# Patient Record
Sex: MALE | Race: BLACK OR AFRICAN AMERICAN | NOT HISPANIC OR LATINO | URBAN - METROPOLITAN AREA
[De-identification: names, ages, dates, MRNs, and addresses within clinical notes are randomized per-mention and may not be internally consistent; named-entity substitution may affect disease eponyms.]

---

## 2023-09-17 ENCOUNTER — INPATIENT (INPATIENT)
Facility: HOSPITAL | Age: 38
LOS: 3 days | Discharge: HOME | DRG: 603 | End: 2023-09-21
Attending: STUDENT IN AN ORGANIZED HEALTH CARE EDUCATION/TRAINING PROGRAM | Admitting: INTERNAL MEDICINE
Payer: COMMERCIAL

## 2023-09-17 VITALS
SYSTOLIC BLOOD PRESSURE: 125 MMHG | RESPIRATION RATE: 18 BRPM | DIASTOLIC BLOOD PRESSURE: 75 MMHG | HEART RATE: 85 BPM | TEMPERATURE: 99 F | OXYGEN SATURATION: 95 % | WEIGHT: 179.9 LBS

## 2023-09-17 DIAGNOSIS — Z29.9 ENCOUNTER FOR PROPHYLACTIC MEASURES, UNSPECIFIED: ICD-10-CM

## 2023-09-17 DIAGNOSIS — L03.114 CELLULITIS OF LEFT UPPER LIMB: ICD-10-CM

## 2023-09-17 LAB
ALBUMIN SERPL ELPH-MCNC: 4.1 G/DL — SIGNIFICANT CHANGE UP (ref 3.3–5)
ALP SERPL-CCNC: 90 U/L — SIGNIFICANT CHANGE UP (ref 40–120)
ALT FLD-CCNC: 9 U/L — LOW (ref 10–45)
ANION GAP SERPL CALC-SCNC: 11 MMOL/L — SIGNIFICANT CHANGE UP (ref 5–17)
AST SERPL-CCNC: 12 U/L — SIGNIFICANT CHANGE UP (ref 10–40)
BASOPHILS # BLD AUTO: 0.03 K/UL — SIGNIFICANT CHANGE UP (ref 0–0.2)
BASOPHILS NFR BLD AUTO: 0.4 % — SIGNIFICANT CHANGE UP (ref 0–2)
BILIRUB SERPL-MCNC: 0.3 MG/DL — SIGNIFICANT CHANGE UP (ref 0.2–1.2)
BUN SERPL-MCNC: 11 MG/DL — SIGNIFICANT CHANGE UP (ref 7–23)
CALCIUM SERPL-MCNC: 9.1 MG/DL — SIGNIFICANT CHANGE UP (ref 8.4–10.5)
CHLORIDE SERPL-SCNC: 100 MMOL/L — SIGNIFICANT CHANGE UP (ref 96–108)
CO2 SERPL-SCNC: 26 MMOL/L — SIGNIFICANT CHANGE UP (ref 22–31)
CREAT SERPL-MCNC: 0.72 MG/DL — SIGNIFICANT CHANGE UP (ref 0.5–1.3)
EGFR: 119 ML/MIN/1.73M2 — SIGNIFICANT CHANGE UP
EOSINOPHIL # BLD AUTO: 0.17 K/UL — SIGNIFICANT CHANGE UP (ref 0–0.5)
EOSINOPHIL NFR BLD AUTO: 2.5 % — SIGNIFICANT CHANGE UP (ref 0–6)
GLUCOSE SERPL-MCNC: 86 MG/DL — SIGNIFICANT CHANGE UP (ref 70–99)
HCT VFR BLD CALC: 41.7 % — SIGNIFICANT CHANGE UP (ref 39–50)
HGB BLD-MCNC: 13.5 G/DL — SIGNIFICANT CHANGE UP (ref 13–17)
HIV 1+2 AB+HIV1 P24 AG SERPL QL IA: SIGNIFICANT CHANGE UP
IMM GRANULOCYTES NFR BLD AUTO: 0.1 % — SIGNIFICANT CHANGE UP (ref 0–0.9)
LYMPHOCYTES # BLD AUTO: 1.77 K/UL — SIGNIFICANT CHANGE UP (ref 1–3.3)
LYMPHOCYTES # BLD AUTO: 25.6 % — SIGNIFICANT CHANGE UP (ref 13–44)
MCHC RBC-ENTMCNC: 30.6 PG — SIGNIFICANT CHANGE UP (ref 27–34)
MCHC RBC-ENTMCNC: 32.4 GM/DL — SIGNIFICANT CHANGE UP (ref 32–36)
MCV RBC AUTO: 94.6 FL — SIGNIFICANT CHANGE UP (ref 80–100)
MONOCYTES # BLD AUTO: 0.58 K/UL — SIGNIFICANT CHANGE UP (ref 0–0.9)
MONOCYTES NFR BLD AUTO: 8.4 % — SIGNIFICANT CHANGE UP (ref 2–14)
MRSA PCR RESULT.: POSITIVE
NEUTROPHILS # BLD AUTO: 4.35 K/UL — SIGNIFICANT CHANGE UP (ref 1.8–7.4)
NEUTROPHILS NFR BLD AUTO: 63 % — SIGNIFICANT CHANGE UP (ref 43–77)
NRBC # BLD: 0 /100 WBCS — SIGNIFICANT CHANGE UP (ref 0–0)
PLATELET # BLD AUTO: 184 K/UL — SIGNIFICANT CHANGE UP (ref 150–400)
POTASSIUM SERPL-MCNC: 3.8 MMOL/L — SIGNIFICANT CHANGE UP (ref 3.5–5.3)
POTASSIUM SERPL-SCNC: 3.8 MMOL/L — SIGNIFICANT CHANGE UP (ref 3.5–5.3)
PROT SERPL-MCNC: 7 G/DL — SIGNIFICANT CHANGE UP (ref 6–8.3)
RBC # BLD: 4.41 M/UL — SIGNIFICANT CHANGE UP (ref 4.2–5.8)
RBC # FLD: 11.9 % — SIGNIFICANT CHANGE UP (ref 10.3–14.5)
S AUREUS DNA NOSE QL NAA+PROBE: POSITIVE
SODIUM SERPL-SCNC: 137 MMOL/L — SIGNIFICANT CHANGE UP (ref 135–145)
WBC # BLD: 6.91 K/UL — SIGNIFICANT CHANGE UP (ref 3.8–10.5)
WBC # FLD AUTO: 6.91 K/UL — SIGNIFICANT CHANGE UP (ref 3.8–10.5)

## 2023-09-17 PROCEDURE — 99285 EMERGENCY DEPT VISIT HI MDM: CPT

## 2023-09-17 PROCEDURE — 73130 X-RAY EXAM OF HAND: CPT | Mod: 26,LT

## 2023-09-17 PROCEDURE — 99223 1ST HOSP IP/OBS HIGH 75: CPT

## 2023-09-17 RX ORDER — INFLUENZA VIRUS VACCINE 15; 15; 15; 15 UG/.5ML; UG/.5ML; UG/.5ML; UG/.5ML
0.5 SUSPENSION INTRAMUSCULAR ONCE
Refills: 0 | Status: DISCONTINUED | OUTPATIENT
Start: 2023-09-17 | End: 2023-09-21

## 2023-09-17 RX ORDER — VANCOMYCIN HCL 1 G
1000 VIAL (EA) INTRAVENOUS ONCE
Refills: 0 | Status: COMPLETED | OUTPATIENT
Start: 2023-09-17 | End: 2023-09-17

## 2023-09-17 RX ORDER — KETOROLAC TROMETHAMINE 30 MG/ML
15 SYRINGE (ML) INJECTION ONCE
Refills: 0 | Status: DISCONTINUED | OUTPATIENT
Start: 2023-09-17 | End: 2023-09-17

## 2023-09-17 RX ORDER — ACETAMINOPHEN 500 MG
650 TABLET ORAL EVERY 6 HOURS
Refills: 0 | Status: DISCONTINUED | OUTPATIENT
Start: 2023-09-17 | End: 2023-09-18

## 2023-09-17 RX ORDER — KETOROLAC TROMETHAMINE 30 MG/ML
15 SYRINGE (ML) INJECTION EVERY 6 HOURS
Refills: 0 | Status: DISCONTINUED | OUTPATIENT
Start: 2023-09-17 | End: 2023-09-18

## 2023-09-17 RX ORDER — VANCOMYCIN HCL 1 G
1250 VIAL (EA) INTRAVENOUS EVERY 12 HOURS
Refills: 0 | Status: DISCONTINUED | OUTPATIENT
Start: 2023-09-17 | End: 2023-09-18

## 2023-09-17 RX ORDER — PIPERACILLIN AND TAZOBACTAM 4; .5 G/20ML; G/20ML
3.38 INJECTION, POWDER, LYOPHILIZED, FOR SOLUTION INTRAVENOUS ONCE
Refills: 0 | Status: COMPLETED | OUTPATIENT
Start: 2023-09-17 | End: 2023-09-17

## 2023-09-17 RX ADMIN — Medication 15 MILLIGRAM(S): at 23:16

## 2023-09-17 RX ADMIN — PIPERACILLIN AND TAZOBACTAM 200 GRAM(S): 4; .5 INJECTION, POWDER, LYOPHILIZED, FOR SOLUTION INTRAVENOUS at 13:47

## 2023-09-17 RX ADMIN — Medication 166.67 MILLIGRAM(S): at 22:36

## 2023-09-17 RX ADMIN — Medication 15 MILLIGRAM(S): at 17:47

## 2023-09-17 RX ADMIN — Medication 250 MILLIGRAM(S): at 14:15

## 2023-09-17 RX ADMIN — Medication 15 MILLIGRAM(S): at 15:00

## 2023-09-17 RX ADMIN — Medication 15 MILLIGRAM(S): at 18:40

## 2023-09-17 RX ADMIN — Medication 15 MILLIGRAM(S): at 13:41

## 2023-09-17 RX ADMIN — PIPERACILLIN AND TAZOBACTAM 3.38 GRAM(S): 4; .5 INJECTION, POWDER, LYOPHILIZED, FOR SOLUTION INTRAVENOUS at 15:01

## 2023-09-17 RX ADMIN — Medication 15 MILLIGRAM(S): at 23:46

## 2023-09-17 RX ADMIN — Medication 1000 MILLIGRAM(S): at 16:16

## 2023-09-17 NOTE — ED PROVIDER NOTE - OBJECTIVE STATEMENT
38 yo m presents c/o pain, swelling to left 4th finger.  Pt stating he woke up yesterday with the finger swollen and painful, went to [facility] and states he was admitted for the night with IV abx, seen by orthopedics who cut open his finger although no pus came out.  Pt stating the plan was for him to be admitted for continued IV abx but he had to leave due to a death in the family.  Pt stating after leaving his hand was hurting a lot and he decided against traveling to see his family and came "to the nearest hospital" which he states was [facility].  Pt denies fever, chills, all other ROS negative.

## 2023-09-17 NOTE — ED PROVIDER NOTE - CLINICAL SUMMARY MEDICAL DECISION MAKING FREE TEXT BOX
36 y/o m no pmh presents c/o pain, swelling to left 4th finger since yesterday, was admitted for cellulitis at [facility] and left AMA.  In ED at [facility], pt afebrile, finger appears cellulitic, no evidence of abscess 36 y/o m no pmh presents c/o pain, swelling to left 4th finger since yesterday, was admitted for cellulitis at [facility] and left AMA.  In ED at [facility], pt afebrile, finger appears cellulitic, no evidence of abscess.  Labs with no leukocytosis, xr unremarkable.  Discussed with hand surgery Dr. Stauffer, recommends admission to medicine for IV abx and he will eval in the hospital.

## 2023-09-17 NOTE — H&P ADULT - NSHPPHYSICALEXAM_GEN_ALL_CORE
VITAL SIGNS:  T(C): 37 (09-17-23 @ 15:30), Max: 37 (09-17-23 @ 12:28)  T(F): 98.6 (09-17-23 @ 15:30), Max: 98.6 (09-17-23 @ 12:28)  HR: 54 (09-17-23 @ 15:30) (54 - 85)  BP: 116/74 (09-17-23 @ 15:30) (116/74 - 125/75)  BP(mean): --  RR: 16 (09-17-23 @ 15:30) (16 - 18)  SpO2: 96% (09-17-23 @ 15:30) (95% - 96%)  Wt(kg): --    PHYSICAL EXAM:  Constitutional: WDWN resting comfortably in bed; NAD  Head: NC/AT  Eyes: PERRL, EOMI, anicteric sclera  ENT: no nasal discharge; uvula midline, no oropharyngeal erythema or exudates; MMM  Neck: supple; no JVD or thyromegaly  Respiratory: CTA B/L; no W/R/R, no retractions  Cardiac: +S1/S2; RRR; no M/R/G; PMI non-displaced  Gastrointestinal: abdomen soft, NT/ND; no rebound or guarding  Back: spine midline, no bony tenderness or step-offs; no CVAT B/L  Extremities: WWP, no clubbing or cyanosis; no peripheral edema  Musculoskeletal: NROM x4; no joint swelling, tenderness or erythema  Vascular: 2+ radial, DP pulses B/L  Dermatologic: skin warm, dry and intact; no rashes, wounds, or scars  Neurologic: AAOx3; CNII-XII grossly intact; no focal deficits  Psychiatric: affect and characteristics of appearance, verbalizations, behaviors are appropriate VITAL SIGNS:  T(C): 37 (09-17-23 @ 15:30), Max: 37 (09-17-23 @ 12:28)  T(F): 98.6 (09-17-23 @ 15:30), Max: 98.6 (09-17-23 @ 12:28)  HR: 54 (09-17-23 @ 15:30) (54 - 85)  BP: 116/74 (09-17-23 @ 15:30) (116/74 - 125/75)  BP(mean): --  RR: 16 (09-17-23 @ 15:30) (16 - 18)  SpO2: 96% (09-17-23 @ 15:30) (95% - 96%)  Wt(kg): --    PHYSICAL EXAM:  Constitutional: WDWN resting comfortably in bed; NAD  Head: NC/AT  Eyes: PERRL, EOMI, anicteric sclera  ENT: no nasal discharge; uvula midline, no oropharyngeal erythema or exudates; MMM  Neck: supple; no JVD or thyromegaly  Respiratory: CTA B/L; no W/R/R, no retractions  Cardiac: +S1/S2; RRR; no M/R/G; PMI non-displaced  Gastrointestinal: abdomen soft, NT/ND; no rebound or guarding  Back: spine midline, no bony tenderness or step-offs; no CVAT B/L  Extremities: WWP, no clubbing or cyanosis; no peripheral edema  Musculoskeletal: NROM x4; no joint swelling, tenderness or erythema  Vascular: 2+ radial, DP pulses B/L  Dermatologic: skin warm, = bandage wrapped over L 4th finger, edematous, erythematous through finger and dorsal aspect of hand  Neurologic: AAOx3; CNII-XII grossly intact; no focal deficits  Psychiatric: affect and characteristics of appearance, verbalizations, behaviors are appropriate

## 2023-09-17 NOTE — PATIENT PROFILE ADULT - STATED REASON FOR ADMISSION
woke up one morning with a blister on the left 4th finger and after 2 days it is swollen and painful.

## 2023-09-17 NOTE — H&P ADULT - PROBLEM SELECTOR PLAN 2
Electrolytes: replete as necessary  Nutrition: regular   DVT: none, IMPROVE score low  Code status: Full   Disposition: RMF > home

## 2023-09-17 NOTE — ED ADULT NURSE REASSESSMENT NOTE - NS ED NURSE REASSESS COMMENT FT1
VAncomycin IVPB completed, no adverse rxn.  Vital signs stable.  dressing in place to left hand 4th digit. for 4 uris admit.  Transfer pending.

## 2023-09-17 NOTE — ED PROVIDER NOTE - MUSCULOSKELETAL, MLM
left 4th finger +swelling, +erythema, +diffuse tenderness, s/p incision on dorsum of phalanx over DIPJ with no purulent discharge from wound, sensation intact distally, no proximal hand swelling or streaking

## 2023-09-17 NOTE — H&P ADULT - ATTENDING COMMENTS
Pt is a 38 yo M with PMH THC use p/w L ring finger pain and swelling x few days. First noticed a blister that he tried to drain on his own using a sewing needle (did not sanitize). Symptoms worsened but did not notice any drainage. Was evaluated at [facility] where ortho saw him and performed I&D and per pt without any drainage or symptom relief; XR reportedly showed soft tissue swelling, and pt was admitted for IV abx. He then left AMA d/t an unexpected family death but returned to the hospital d/t worsening symptoms. Of note, had similar episode to his face that he picked / self treated and his eye, similar story, both now resolved. Otherwise has been in his usual state of health without sick contacts. Denies IVDU; intermittently smokes THC. Hemodynamically stable on arrival with labs unremarkable. XR L hand neg acute process. Exam notable for L fourth digit dorsal aspect incision site (between DIP/PIP) c/d/i without visible purulence, some surrounding erythema and swelling with minimal overlying warmth. Sensation intact and equal throughout all 10 digits; full ROM at L 4th MCP and PIP; does have pain with testing but no limitation in movement. Pt is a 36 yo M with PMH THC use p/w L ring finger pain and swelling x few days. First noticed a blister that he tried to drain on his own using a sewing needle (did not sanitize). Symptoms worsened but did not notice any drainage. Was evaluated at [facility] where ortho saw him and performed I&D and per pt without any drainage or symptom relief; XR reportedly showed soft tissue swelling, and pt was admitted for IV abx. He then left AMA d/t an unexpected family death but returned to the hospital d/t worsening symptoms. Of note, had similar episode to his face that he picked / self treated and his eye, similar story, both now resolved. Otherwise has been in his usual state of health without sick contacts. Denies IVDU; intermittently smokes THC. Hemodynamically stable on arrival with labs unremarkable. XR L hand neg acute process. Hand surgery consulted, recommending IV abx and admission for monitoring. Received vanc/zosyn and toradol. Exam notable for L fourth digit dorsal aspect incision site (between DIP/PIP) c/d/i without visible purulence, some surrounding erythema and swelling with minimal overlying warmth. Sensation intact and equal throughout all 10 digits; full ROM at L 4th MCP and PIP; does have pain with testing but no limitation in range.     A/P:  #Cellulitis  - presentation as above; educated pt on avoiding picking at skin lesions especially with unsanitized sharp objects   - not meeting SIRS criteria  - labs unremarkable   - exam benign - ROM intact, sensation intact -- educated pt to notify team if this changes   - XR L hand neg acute finding  - will need collateral from [facility] -- ?culture data as pt s/p I&D  - hand sx consulted, f/u recs  - c/w vancomycin for now -- deescalate as able  - f/u BCx in lab  - send MRSA PCR  - low threshold for ID consult if not improved in 24h given digit involvement   - would also consider CT if not improving  - pain control: tylenol / toradol PRN     Dispo: pending medical optimization  Discussed with HS, rest as outlined above.

## 2023-09-17 NOTE — H&P ADULT - MLM HIDDEN
yes Pt with burning chest pain for 3 weeks, will check labs, EKG an reevaluate. Pain appears to be related to food, possible gastritis.

## 2023-09-17 NOTE — ED ADULT NURSE NOTE - OBJECTIVE STATEMENT
Patient arrives to ED w/ dressing and splint to left hand and 4th digit,   was seen at an ED in the [place] yesterday and I & D procedure done on abscess, had to leave the hospital due to family emergency but has increased pain so went back to another ED.  No fever/chills.  Did not take any pain meds PTA to ED,  was given Morphine yesterday.

## 2023-09-17 NOTE — ED ADULT NURSE NOTE - CHPI ED NUR QUALITY2
aching Ear Star Wedge Flap Text: The defect edges were debeveled with a #15 blade scalpel.  Given the location of the defect and the proximity to free margins (helical rim) an ear star wedge flap was deemed most appropriate.  Using a sterile surgical marker, the appropriate flap was drawn incorporating the defect and placing the expected incisions between the helical rim and antihelix where possible.  The area thus outlined was incised through and through with a #15 scalpel blade.

## 2023-09-17 NOTE — H&P ADULT - TIME BILLING
review of laboratory data, radiology results, consultants' recommendations, documentation in [place], discussion with patient/ACP and interdisciplinary staff (such as , social workers, etc). Interventions were performed as documented above.

## 2023-09-17 NOTE — ED ADULT NURSE NOTE - OTHER COMPLAINTS
Patient reports that he was in a different hospital and his finger was drained but he had to leave. Mid-Level Procedure Text (E): After obtaining clear surgical margins the patient was sent to a mid-level provider for surgical repair.  The patient understands they will receive post-surgical care and follow-up from the mid-level provider.

## 2023-09-17 NOTE — ED PROVIDER NOTE - NS ED ATTENDING STATEMENT MOD
This was a shared visit with the KORIN. I reviewed and verified the documentation and independently performed the documented:

## 2023-09-17 NOTE — H&P ADULT - NSHPLABSRESULTS_GEN_ALL_CORE
LABS:                         13.5   6.91  )-----------( 184      ( 17 Sep 2023 13:15 )             41.7     09-17    137  |  100  |  11  ----------------------------<  86  3.8   |  26  |  0.72    Ca    9.1      17 Sep 2023 13:15    TPro  7.0  /  Alb  4.1  /  TBili  0.3  /  DBili  x   /  AST  12  /  ALT  9<L>  /  AlkPhos  90  09-17      Urinalysis Basic - ( 17 Sep 2023 13:15 )    Color: x / Appearance: x / SG: x / pH: x  Gluc: 86 mg/dL / Ketone: x  / Bili: x / Urobili: x   Blood: x / Protein: x / Nitrite: x   Leuk Esterase: x / RBC: x / WBC x   Sq Epi: x / Non Sq Epi: x / Bacteria: x            RADIOLOGY, EKG & ADDITIONAL TESTS: Reviewed.

## 2023-09-17 NOTE — PATIENT PROFILE ADULT - FALL HARM RISK - UNIVERSAL INTERVENTIONS
Bed in lowest position, wheels locked, appropriate side rails in place/Call bell, personal items and telephone in reach/Instruct patient to call for assistance before getting out of bed or chair/Non-slip footwear when patient is out of bed/[place] to call system/Physically safe environment - no spills, clutter or unnecessary equipment/Purposeful Proactive Rounding/Room/bathroom lighting operational, light cord in reach

## 2023-09-17 NOTE — H&P ADULT - HISTORY OF PRESENT ILLNESS
In the ED:  Initial vital signs: T: 98.6 F, HR: 85, BP: 125/75, R: 18, SpO2: 95% on RA  Labs: unremarkable, HIV negative  Imaging: XR: Hand no acute fx, no OM  EKG: none   Medications: Toradol, Zosyn 3.375 g x1, vanc 1g x1  Consults: Hand Surgeon Dr. Stauffer  39 y/o M w/ no PMHx who presented to the ED with worsening finger pain and swelling. He was seen at [facility] after 2 day history of L 4th finger pain, erythema and edema. Patient states it started with a small blister which he tried to drain with a needle at home. He was seen by ortho, s/p I&D with no symptomatic relief. Started on IV vanc and zosyn and was planned for admission. However, pt had to AMA due to family emergency. He then decided to present today due to worsening pain.XR L hand showed mild tissue swelling, no fracture or OM. Patient denies any fevers, chills, blunt trauma, injury or URI symptoms. Denies any sick contacts but lives at home with baby niece. Patient noted to have folliculitis over L cheek which he popped 2 weeks ago. He then developed a stye over L eye with erythema and drainage several days ago which self resolved. And feels these lesions are connected.         In the ED:  Initial vital signs: T: 98.6 F, HR: 85, BP: 125/75, R: 18, SpO2: 95% on RA  Labs: unremarkable, HIV negative  Imaging: XR: Hand no acute fx, no OM  EKG: none   Medications: Toradol, Zosyn 3.375 g x1, vanc 1g x1  Consults: Hand Surgeon Dr. Stauffer  39 y/o M w/ no PMHx who presented to the ED with worsening finger pain and swelling. He was seen at [facility] after 2 day history of L 4th finger pain, erythema and edema. Patient states it started with a small blister which he tried to drain with a needle at home. He was seen by ortho, s/p I&D with no symptomatic relief. XR L hand showed mild tissue swelling, no fracture or OM. Started on IV vanc and zosyn and was planned for admission. However, pt had to AMA due to family emergency. He then decided to present today due to worsening pain. Patient denies any fevers, chills, blunt trauma, injury or URI symptoms. Denies any sick contacts but lives at home with baby niece. Patient noted to have folliculitis over L cheek which he popped 2 weeks ago. He then developed a stye over L eye with erythema and drainage several days ago which self resolved. And feels these lesions are connected.         In the ED:  Initial vital signs: T: 98.6 F, HR: 85, BP: 125/75, R: 18, SpO2: 95% on RA  Labs: unremarkable, HIV negative  Imaging: XR: Hand no acute fx, no OM  EKG: none   Medications: Toradol, Zosyn 3.375 g x1, vanc 1g x1  Consults: Hand Surgeon Dr. Stauffer

## 2023-09-17 NOTE — ED ADULT NURSE REASSESSMENT NOTE - NS ED NURSE REASSESS COMMENT FT1
Patient aoX 3, c/o of left hand 4th digit pain, swelling and discharge, no fever /chills.  Vital signs stable.  Left arm PIV #20 in place, all labs sent,no complications.  IV ABT ongoing, no adverse rxn.  For admit.

## 2023-09-17 NOTE — H&P ADULT - NSHPSOCIALHISTORY_GEN_ALL_CORE
Occasional EtOH use, smokes cannabis, no other drugs/IVDU  Lives in the [place] with family Occasional EtOH use, smokes cannabis, no other drugs/IVDU  Lives in the [place] with family  Independent

## 2023-09-17 NOTE — ED PROVIDER NOTE - ATTENDING APP SHARED VISIT CONTRIBUTION OF CARE
I have reviewed and agree with all pertinent clinical information, including history and physical exam and agree with treatment plan of the PA/NP.

## 2023-09-17 NOTE — ED ADULT NURSE NOTE - NSFALLUNIVINTERV_ED_ALL_ED
Bed/Stretcher in lowest position, wheels locked, appropriate side rails in place/Call bell, personal items and telephone in reach/Instruct patient to call for assistance before getting out of bed/chair/stretcher/Non-slip footwear applied when patient is off stretcher/[place] to call system/Physically safe environment - no spills, clutter or unnecessary equipment/Purposeful proactive rounding/Room/bathroom lighting operational, light cord in reach

## 2023-09-17 NOTE — H&P ADULT - NSCORESITESY/N_GEN_A_CORE_RD
Chief Complaint   Patient presents with     Follow Up     Ump Post-Op     Vitals were taken and medications reconciled.    Onur Melendez, EMT  1:45 PM  
No

## 2023-09-18 LAB
ALBUMIN SERPL ELPH-MCNC: 3.4 G/DL — SIGNIFICANT CHANGE UP (ref 3.3–5)
ALP SERPL-CCNC: 82 U/L — SIGNIFICANT CHANGE UP (ref 40–120)
ALT FLD-CCNC: 7 U/L — LOW (ref 10–45)
ANION GAP SERPL CALC-SCNC: 7 MMOL/L — SIGNIFICANT CHANGE UP (ref 5–17)
AST SERPL-CCNC: 12 U/L — SIGNIFICANT CHANGE UP (ref 10–40)
BASOPHILS # BLD AUTO: 0.02 K/UL — SIGNIFICANT CHANGE UP (ref 0–0.2)
BASOPHILS NFR BLD AUTO: 0.3 % — SIGNIFICANT CHANGE UP (ref 0–2)
BILIRUB SERPL-MCNC: 0.3 MG/DL — SIGNIFICANT CHANGE UP (ref 0.2–1.2)
BUN SERPL-MCNC: 12 MG/DL — SIGNIFICANT CHANGE UP (ref 7–23)
CALCIUM SERPL-MCNC: 8.6 MG/DL — SIGNIFICANT CHANGE UP (ref 8.4–10.5)
CHLORIDE SERPL-SCNC: 101 MMOL/L — SIGNIFICANT CHANGE UP (ref 96–108)
CO2 SERPL-SCNC: 26 MMOL/L — SIGNIFICANT CHANGE UP (ref 22–31)
CREAT SERPL-MCNC: 0.89 MG/DL — SIGNIFICANT CHANGE UP (ref 0.5–1.3)
EGFR: 112 ML/MIN/1.73M2 — SIGNIFICANT CHANGE UP
EOSINOPHIL # BLD AUTO: 0.19 K/UL — SIGNIFICANT CHANGE UP (ref 0–0.5)
EOSINOPHIL NFR BLD AUTO: 3 % — SIGNIFICANT CHANGE UP (ref 0–6)
GLUCOSE SERPL-MCNC: 115 MG/DL — HIGH (ref 70–99)
GRAM STN FLD: SIGNIFICANT CHANGE UP
HCT VFR BLD CALC: 37.9 % — LOW (ref 39–50)
HGB BLD-MCNC: 12.2 G/DL — LOW (ref 13–17)
IMM GRANULOCYTES NFR BLD AUTO: 0.2 % — SIGNIFICANT CHANGE UP (ref 0–0.9)
LYMPHOCYTES # BLD AUTO: 1.55 K/UL — SIGNIFICANT CHANGE UP (ref 1–3.3)
LYMPHOCYTES # BLD AUTO: 24.4 % — SIGNIFICANT CHANGE UP (ref 13–44)
MAGNESIUM SERPL-MCNC: 1.9 MG/DL — SIGNIFICANT CHANGE UP (ref 1.6–2.6)
MCHC RBC-ENTMCNC: 30.4 PG — SIGNIFICANT CHANGE UP (ref 27–34)
MCHC RBC-ENTMCNC: 32.2 GM/DL — SIGNIFICANT CHANGE UP (ref 32–36)
MCV RBC AUTO: 94.5 FL — SIGNIFICANT CHANGE UP (ref 80–100)
MONOCYTES # BLD AUTO: 0.52 K/UL — SIGNIFICANT CHANGE UP (ref 0–0.9)
MONOCYTES NFR BLD AUTO: 8.2 % — SIGNIFICANT CHANGE UP (ref 2–14)
NEUTROPHILS # BLD AUTO: 4.07 K/UL — SIGNIFICANT CHANGE UP (ref 1.8–7.4)
NEUTROPHILS NFR BLD AUTO: 63.9 % — SIGNIFICANT CHANGE UP (ref 43–77)
NRBC # BLD: 0 /100 WBCS — SIGNIFICANT CHANGE UP (ref 0–0)
PHOSPHATE SERPL-MCNC: 2.2 MG/DL — LOW (ref 2.5–4.5)
PLATELET # BLD AUTO: 173 K/UL — SIGNIFICANT CHANGE UP (ref 150–400)
POTASSIUM SERPL-MCNC: 3.8 MMOL/L — SIGNIFICANT CHANGE UP (ref 3.5–5.3)
POTASSIUM SERPL-SCNC: 3.8 MMOL/L — SIGNIFICANT CHANGE UP (ref 3.5–5.3)
PROT SERPL-MCNC: 5.9 G/DL — LOW (ref 6–8.3)
RBC # BLD: 4.01 M/UL — LOW (ref 4.2–5.8)
RBC # FLD: 11.9 % — SIGNIFICANT CHANGE UP (ref 10.3–14.5)
SODIUM SERPL-SCNC: 134 MMOL/L — LOW (ref 135–145)
SPECIMEN SOURCE: SIGNIFICANT CHANGE UP
VANCOMYCIN TROUGH SERPL-MCNC: 5.6 UG/ML — LOW (ref 10–20)
WBC # BLD: 6.36 K/UL — SIGNIFICANT CHANGE UP (ref 3.8–10.5)
WBC # FLD AUTO: 6.36 K/UL — SIGNIFICANT CHANGE UP (ref 3.8–10.5)

## 2023-09-18 PROCEDURE — 99232 SBSQ HOSP IP/OBS MODERATE 35: CPT | Mod: GC

## 2023-09-18 PROCEDURE — 99222 1ST HOSP IP/OBS MODERATE 55: CPT

## 2023-09-18 RX ORDER — VANCOMYCIN HCL 1 G
1500 VIAL (EA) INTRAVENOUS EVERY 12 HOURS
Refills: 0 | Status: DISCONTINUED | OUTPATIENT
Start: 2023-09-18 | End: 2023-09-19

## 2023-09-18 RX ORDER — KETOROLAC TROMETHAMINE 30 MG/ML
15 SYRINGE (ML) INJECTION EVERY 6 HOURS
Refills: 0 | Status: DISCONTINUED | OUTPATIENT
Start: 2023-09-18 | End: 2023-09-21

## 2023-09-18 RX ORDER — SODIUM,POTASSIUM PHOSPHATES 278-250MG
1 POWDER IN PACKET (EA) ORAL EVERY 6 HOURS
Refills: 0 | Status: COMPLETED | OUTPATIENT
Start: 2023-09-18 | End: 2023-09-18

## 2023-09-18 RX ORDER — ACETAMINOPHEN 500 MG
650 TABLET ORAL EVERY 6 HOURS
Refills: 0 | Status: DISCONTINUED | OUTPATIENT
Start: 2023-09-18 | End: 2023-09-21

## 2023-09-18 RX ADMIN — Medication 1 PACKET(S): at 16:55

## 2023-09-18 RX ADMIN — Medication 15 MILLIGRAM(S): at 06:31

## 2023-09-18 RX ADMIN — Medication 650 MILLIGRAM(S): at 14:59

## 2023-09-18 RX ADMIN — Medication 650 MILLIGRAM(S): at 21:54

## 2023-09-18 RX ADMIN — Medication 15 MILLIGRAM(S): at 06:01

## 2023-09-18 RX ADMIN — Medication 650 MILLIGRAM(S): at 14:46

## 2023-09-18 RX ADMIN — Medication 15 MILLIGRAM(S): at 11:47

## 2023-09-18 RX ADMIN — Medication 15 MILLIGRAM(S): at 11:24

## 2023-09-18 RX ADMIN — Medication 1 PACKET(S): at 21:55

## 2023-09-18 RX ADMIN — Medication 166.67 MILLIGRAM(S): at 11:25

## 2023-09-18 NOTE — CONSULT NOTE ADULT - SUBJECTIVE AND OBJECTIVE BOX
HPI:  38 y/o M w/ no PMHx who presented to the ED with worsening finger pain and swelling. He was seen at [facility] after 2 day history of L 4th finger pain, erythema and edema. Patient states it started with a small blister which he tried to drain with a needle at home. He was seen by ortho, s/p I&D with no symptomatic relief. XR L hand showed mild tissue swelling, no fracture or OM. Started on IV vanc and zosyn and was planned for admission. However, pt had to AMA due to family emergency. He then decided to present today due to worsening pain. Patient denies any fevers, chills, blunt trauma, injury or URI symptoms. Denies any sick contacts but lives at home with baby niece. Patient noted to have folliculitis over L cheek which he popped 2 weeks ago. He then developed a stye over L eye with erythema and drainage several days ago which self resolved. And feels these lesions are connected.         In the ED:  Initial vital signs: T: 98.6 F, HR: 85, BP: 125/75, R: 18, SpO2: 95% on RA  Labs: unremarkable, HIV negative  Imaging: XR: Hand no acute fx, no OM  EKG: none   Medications: Toradol, Zosyn 3.375 g x1, vanc 1g x1  Consults: Hand Surgeon Dr. Stauffer  (17 Sep 2023 15:40)      PAST MEDICAL & SURGICAL HISTORY:  No pertinent past medical history      No significant past surgical history            REVIEW OF SYSTEMS:    General:	 no weakness; no fevers, no chills  Skin/Breast: no rash  Respiratory and Thorax: no SOB, no cough  Cardiovascular:	No chest pain  Gastrointestinal:	 no nausea, vomiting , diarrhea  Genitourinary:	no dysuria, no difficulty urinating, no hematuria  Musculoskeletal:	no weakness, no joint swelling/pain  Neurological:	no focal weakness/numbness  Endocrine:	no polyuria, no polydipsia      ANTIBIOTICS:  MEDICATIONS  (STANDING):  acetaminophen     Tablet .. 650 milliGRAM(s) Oral every 6 hours  influenza   Vaccine 0.5 milliLiter(s) IntraMuscular once  potassium phosphate / sodium phosphate Powder (PHOS-NaK) 1 Packet(s) Oral every 6 hours  vancomycin  IVPB 1250 milliGRAM(s) IV Intermittent every 12 hours    MEDICATIONS  (PRN):  ketorolac   Injectable 15 milliGRAM(s) IV Push every 6 hours PRN Severe Pain (7 - 10)      Allergies    No Known Allergies    Intolerances        SOCIAL HISTORY:    FAMILY HISTORY:  No pertinent family history in first degree relatives        Vital Signs Last 24 Hrs  T(C): 37.1 (18 Sep 2023 09:10), Max: 37.2 (18 Sep 2023 06:02)  T(F): 98.8 (18 Sep 2023 09:10), Max: 99 (18 Sep 2023 06:02)  HR: 57 (18 Sep 2023 09:10) (54 - 72)  BP: 109/67 (18 Sep 2023 09:10) (109/67 - 133/67)  BP(mean): --  RR: 17 (18 Sep 2023 09:10) (16 - 17)  SpO2: 96% (18 Sep 2023 09:10) (96% - 98%)    Parameters below as of 18 Sep 2023 09:10  Patient On (Oxygen Delivery Method): room air        PHYSICAL EXAM:  Constitutional:Well-developed, well nourished  Eyes:ROHIT, EOMI  Ear/Nose/Throat: no oral lesion, no sinus tenderness on percussion	  Neck:  supple  Respiratory: CTA george  Cardiovascular: S1S2 RRR, no murmurs  Gastrointestinal:soft, (+) BS, no HSM  Extremities:  left 4th finger with + swelling, + purulent drainage from open wound   Vascular: DP Pulse:	right normal; left normal            LABS:                        12.2   6.36  )-----------( 173      ( 18 Sep 2023 05:30 )             37.9     09-18    134<L>  |  101  |  12  ----------------------------<  115<H>  3.8   |  26  |  0.89    Ca    8.6      18 Sep 2023 05:30  Phos  2.2     09-18  Mg     1.9     09-18    TPro  5.9<L>  /  Alb  3.4  /  TBili  0.3  /  DBili  x   /  AST  12  /  ALT  7<L>  /  AlkPhos  82  09-18      Urinalysis Basic - ( 18 Sep 2023 05:30 )    Color: x / Appearance: x / SG: x / pH: x  Gluc: 115 mg/dL / Ketone: x  / Bili: x / Urobili: x   Blood: x / Protein: x / Nitrite: x   Leuk Esterase: x / RBC: x / WBC x   Sq Epi: x / Non Sq Epi: x / Bacteria: x        MICROBIOLOGY:    Culture - Blood (09.17.23 @ 13:15)    Specimen Source: .Blood Blood   Culture Results:   No growth at 1 day.    Culture - Blood (09.17.23 @ 13:15)    Specimen Source: .Blood Blood   Culture Results:   No growth at 1 day.      RADIOLOGY & ADDITIONAL STUDIES:

## 2023-09-18 NOTE — PROGRESS NOTE ADULT - SUBJECTIVE AND OBJECTIVE BOX
OVERNIGHT EVENTS:    SUBJECTIVE / INTERVAL HPI: Patient seen and examined at bedside.     VITAL SIGNS:  Vital Signs Last 24 Hrs  T(C): 37.2 (18 Sep 2023 06:02), Max: 37.2 (18 Sep 2023 06:02)  T(F): 99 (18 Sep 2023 06:02), Max: 99 (18 Sep 2023 06:02)  HR: 67 (18 Sep 2023 06:02) (54 - 85)  BP: 133/67 (18 Sep 2023 06:02) (112/61 - 133/67)  BP(mean): --  RR: 16 (18 Sep 2023 06:02) (16 - 18)  SpO2: 97% (18 Sep 2023 06:02) (95% - 98%)    Parameters below as of 18 Sep 2023 06:02  Patient On (Oxygen Delivery Method): room air        PHYSICAL EXAM:    General: alert, in no acute distress  HEENT: NC/AT; PERRL, anicteric sclera; MMM  Neck: supple  Cardiovascular: +S1/S2, RRR  Respiratory: CTA B/L; no W/R/R  Gastrointestinal: soft, NT/ND; +BSx4  Extremities: WWP; no edema, clubbing or cyanosis  Vascular: 2+ radial, DP/PT pulses B/L  Neurological: AAOx3; no focal deficits    MEDICATIONS:  MEDICATIONS  (STANDING):  influenza   Vaccine 0.5 milliLiter(s) IntraMuscular once  ketorolac   Injectable 15 milliGRAM(s) IV Push every 6 hours  vancomycin  IVPB 1250 milliGRAM(s) IV Intermittent every 12 hours    MEDICATIONS  (PRN):  acetaminophen     Tablet .. 650 milliGRAM(s) Oral every 6 hours PRN Temp greater or equal to 38C (100.4F), Mild Pain (1 - 3)      ALLERGIES:  Allergies    No Known Allergies    Intolerances        LABS:                        13.5   6.91  )-----------( 184      ( 17 Sep 2023 13:15 )             41.7     09-17    137  |  100  |  11  ----------------------------<  86  3.8   |  26  |  0.72    Ca    9.1      17 Sep 2023 13:15    TPro  7.0  /  Alb  4.1  /  TBili  0.3  /  DBili  x   /  AST  12  /  ALT  9<L>  /  AlkPhos  90  09-17      Urinalysis Basic - ( 17 Sep 2023 13:15 )    Color: x / Appearance: x / SG: x / pH: x  Gluc: 86 mg/dL / Ketone: x  / Bili: x / Urobili: x   Blood: x / Protein: x / Nitrite: x   Leuk Esterase: x / RBC: x / WBC x   Sq Epi: x / Non Sq Epi: x / Bacteria: x      CAPILLARY BLOOD GLUCOSE          RADIOLOGY & ADDITIONAL TESTS: Reviewed. OVERNIGHT EVENTS: BRANDON    SUBJECTIVE / INTERVAL HPI: Patient seen and examined at bedside. He c/o intermittent pain and drainaged of the wound. Denies any fevers or chills.     VITAL SIGNS:  Vital Signs Last 24 Hrs  T(C): 37.2 (18 Sep 2023 06:02), Max: 37.2 (18 Sep 2023 06:02)  T(F): 99 (18 Sep 2023 06:02), Max: 99 (18 Sep 2023 06:02)  HR: 67 (18 Sep 2023 06:02) (54 - 85)  BP: 133/67 (18 Sep 2023 06:02) (112/61 - 133/67)  BP(mean): --  RR: 16 (18 Sep 2023 06:02) (16 - 18)  SpO2: 97% (18 Sep 2023 06:02) (95% - 98%)    Parameters below as of 18 Sep 2023 06:02  Patient On (Oxygen Delivery Method): room air        PHYSICAL EXAM:    General: alert, in no acute distress  HEENT: NC/AT; PERRL, anicteric sclera; MMM  Neck: supple  Cardiovascular: +S1/S2, RRR  Respiratory: CTA B/L; no W/R/R  Gastrointestinal: soft, NT/ND; +BSx4  Extremities: WWP; no edema, clubbing or cyanosis  Skin: erythema and edema of 4th finger, draining   Vascular: 2+ radial, DP/PT pulses B/L  Neurological: AAOx3; no focal deficits    MEDICATIONS:  MEDICATIONS  (STANDING):  influenza   Vaccine 0.5 milliLiter(s) IntraMuscular once  ketorolac   Injectable 15 milliGRAM(s) IV Push every 6 hours  vancomycin  IVPB 1250 milliGRAM(s) IV Intermittent every 12 hours    MEDICATIONS  (PRN):  acetaminophen     Tablet .. 650 milliGRAM(s) Oral every 6 hours PRN Temp greater or equal to 38C (100.4F), Mild Pain (1 - 3)      ALLERGIES:  Allergies    No Known Allergies    Intolerances        LABS:                        13.5   6.91  )-----------( 184      ( 17 Sep 2023 13:15 )             41.7     09-17    137  |  100  |  11  ----------------------------<  86  3.8   |  26  |  0.72    Ca    9.1      17 Sep 2023 13:15    TPro  7.0  /  Alb  4.1  /  TBili  0.3  /  DBili  x   /  AST  12  /  ALT  9<L>  /  AlkPhos  90  09-17      Urinalysis Basic - ( 17 Sep 2023 13:15 )    Color: x / Appearance: x / SG: x / pH: x  Gluc: 86 mg/dL / Ketone: x  / Bili: x / Urobili: x   Blood: x / Protein: x / Nitrite: x   Leuk Esterase: x / RBC: x / WBC x   Sq Epi: x / Non Sq Epi: x / Bacteria: x      CAPILLARY BLOOD GLUCOSE          RADIOLOGY & ADDITIONAL TESTS: Reviewed.

## 2023-09-18 NOTE — PROGRESS NOTE ADULT - PROBLEM SELECTOR PLAN 1
Patient with L 4th finger cellulitis, s/p I&D at OSH with no improvement. 0/4 SIRS criteria, no leukocytosis or fever. No signs of necrotizing fascitis as pain is localized over 4th finger and dorsal aspect of hand.   - MRSA +    Plan:  - c/w vancomycin 1250 mg IV redosed with trough  - pain control tylenol and Toradol   - f/u Bcx  - elevation of the affected limb Patient with L 4th finger cellulitis, s/p I&D at OSH with no improvement. 0/4 SIRS criteria, no leukocytosis or fever. No signs of necrotizing fascitis as pain is localized over 4th finger and dorsal aspect of hand.   - MRSA +    Plan:  - c/w vancomycin 1250 mg IV redosed with trough  - pain control tylenol and Toradol   - f/u Bcx and wcx  - elevation of the affected limb

## 2023-09-18 NOTE — PROGRESS NOTE ADULT - ATTENDING COMMENTS
Patient is 36 yo man admitted for treatment of 4th finger cellulitis with underlying collection. S/p I&D at another institution. Seen by Ortho team here and no surgical intervention was indicated.   ----cont with broad spectrum antibiotics for now. Would culture was send out today, follow on identification of the causative bacteria. MRSA nasal swab was positive

## 2023-09-18 NOTE — CONSULT NOTE ADULT - ASSESSMENT
IMPRESSION:  Purulent cellulitis and abscess of the finger on the left hand. Given purulence and folliculitis of the left cheek, suspect community acquired MRSA infection    Recommend:  1.  Continue Vancomycin 1250 mg IV q12hrs.  Check vanco trough 30 minutes before the 4th dose  2.  Follow up wound cultures.  Will adjust antibiotics based on that    ID team 1 will follow IMPRESSION:  Purulent cellulitis and abscess of the finger on the left hand. Given purulence and folliculitis of the left cheek, suspect community acquired MRSA infection    Recommend:  1.  Continue Vancomycin 1250 mg IV q12hrs.  Check vanco trough 30 minutes before the 4th dose (tonight at 11 pm). Goal is 10-15.  2.  Follow up wound cultures.  Will adjust antibiotics based on that    ID team 1 will follow

## 2023-09-19 ENCOUNTER — TRANSCRIPTION ENCOUNTER (OUTPATIENT)
Age: 38
End: 2023-09-19

## 2023-09-19 PROBLEM — Z78.9 OTHER SPECIFIED HEALTH STATUS: Chronic | Status: ACTIVE | Noted: 2023-09-17

## 2023-09-19 LAB
ALBUMIN SERPL ELPH-MCNC: 3.3 G/DL — SIGNIFICANT CHANGE UP (ref 3.3–5)
ALP SERPL-CCNC: 86 U/L — SIGNIFICANT CHANGE UP (ref 40–120)
ALT FLD-CCNC: 7 U/L — LOW (ref 10–45)
ANION GAP SERPL CALC-SCNC: 6 MMOL/L — SIGNIFICANT CHANGE UP (ref 5–17)
AST SERPL-CCNC: 11 U/L — SIGNIFICANT CHANGE UP (ref 10–40)
BASOPHILS # BLD AUTO: 0.03 K/UL — SIGNIFICANT CHANGE UP (ref 0–0.2)
BASOPHILS NFR BLD AUTO: 0.6 % — SIGNIFICANT CHANGE UP (ref 0–2)
BILIRUB SERPL-MCNC: 0.2 MG/DL — SIGNIFICANT CHANGE UP (ref 0.2–1.2)
BUN SERPL-MCNC: 15 MG/DL — SIGNIFICANT CHANGE UP (ref 7–23)
CALCIUM SERPL-MCNC: 9 MG/DL — SIGNIFICANT CHANGE UP (ref 8.4–10.5)
CHLORIDE SERPL-SCNC: 105 MMOL/L — SIGNIFICANT CHANGE UP (ref 96–108)
CO2 SERPL-SCNC: 27 MMOL/L — SIGNIFICANT CHANGE UP (ref 22–31)
CREAT SERPL-MCNC: 1.14 MG/DL — SIGNIFICANT CHANGE UP (ref 0.5–1.3)
EGFR: 84 ML/MIN/1.73M2 — SIGNIFICANT CHANGE UP
EOSINOPHIL # BLD AUTO: 0.2 K/UL — SIGNIFICANT CHANGE UP (ref 0–0.5)
EOSINOPHIL NFR BLD AUTO: 3.7 % — SIGNIFICANT CHANGE UP (ref 0–6)
GLUCOSE SERPL-MCNC: 114 MG/DL — HIGH (ref 70–99)
HCT VFR BLD CALC: 38.1 % — LOW (ref 39–50)
HGB BLD-MCNC: 12.2 G/DL — LOW (ref 13–17)
IMM GRANULOCYTES NFR BLD AUTO: 0.2 % — SIGNIFICANT CHANGE UP (ref 0–0.9)
LYMPHOCYTES # BLD AUTO: 1.68 K/UL — SIGNIFICANT CHANGE UP (ref 1–3.3)
LYMPHOCYTES # BLD AUTO: 30.8 % — SIGNIFICANT CHANGE UP (ref 13–44)
MAGNESIUM SERPL-MCNC: 1.9 MG/DL — SIGNIFICANT CHANGE UP (ref 1.6–2.6)
MCHC RBC-ENTMCNC: 30.3 PG — SIGNIFICANT CHANGE UP (ref 27–34)
MCHC RBC-ENTMCNC: 32 GM/DL — SIGNIFICANT CHANGE UP (ref 32–36)
MCV RBC AUTO: 94.5 FL — SIGNIFICANT CHANGE UP (ref 80–100)
MONOCYTES # BLD AUTO: 0.59 K/UL — SIGNIFICANT CHANGE UP (ref 0–0.9)
MONOCYTES NFR BLD AUTO: 10.8 % — SIGNIFICANT CHANGE UP (ref 2–14)
NEUTROPHILS # BLD AUTO: 2.94 K/UL — SIGNIFICANT CHANGE UP (ref 1.8–7.4)
NEUTROPHILS NFR BLD AUTO: 53.9 % — SIGNIFICANT CHANGE UP (ref 43–77)
NRBC # BLD: 0 /100 WBCS — SIGNIFICANT CHANGE UP (ref 0–0)
PHOSPHATE SERPL-MCNC: 3.8 MG/DL — SIGNIFICANT CHANGE UP (ref 2.5–4.5)
PLATELET # BLD AUTO: 179 K/UL — SIGNIFICANT CHANGE UP (ref 150–400)
POTASSIUM SERPL-MCNC: 3.8 MMOL/L — SIGNIFICANT CHANGE UP (ref 3.5–5.3)
POTASSIUM SERPL-SCNC: 3.8 MMOL/L — SIGNIFICANT CHANGE UP (ref 3.5–5.3)
PROT SERPL-MCNC: 5.8 G/DL — LOW (ref 6–8.3)
RBC # BLD: 4.03 M/UL — LOW (ref 4.2–5.8)
RBC # FLD: 11.8 % — SIGNIFICANT CHANGE UP (ref 10.3–14.5)
SODIUM SERPL-SCNC: 138 MMOL/L — SIGNIFICANT CHANGE UP (ref 135–145)
WBC # BLD: 5.45 K/UL — SIGNIFICANT CHANGE UP (ref 3.8–10.5)
WBC # FLD AUTO: 5.45 K/UL — SIGNIFICANT CHANGE UP (ref 3.8–10.5)

## 2023-09-19 PROCEDURE — 99232 SBSQ HOSP IP/OBS MODERATE 35: CPT

## 2023-09-19 PROCEDURE — 99233 SBSQ HOSP IP/OBS HIGH 50: CPT | Mod: GC

## 2023-09-19 RX ORDER — DAPTOMYCIN 500 MG/10ML
500 INJECTION, POWDER, LYOPHILIZED, FOR SOLUTION INTRAVENOUS EVERY 24 HOURS
Refills: 0 | Status: DISCONTINUED | OUTPATIENT
Start: 2023-09-19 | End: 2023-09-19

## 2023-09-19 RX ORDER — DAPTOMYCIN 500 MG/10ML
500 INJECTION, POWDER, LYOPHILIZED, FOR SOLUTION INTRAVENOUS ONCE
Refills: 0 | Status: COMPLETED | OUTPATIENT
Start: 2023-09-19 | End: 2023-09-19

## 2023-09-19 RX ORDER — DAPTOMYCIN 500 MG/10ML
INJECTION, POWDER, LYOPHILIZED, FOR SOLUTION INTRAVENOUS
Refills: 0 | Status: DISCONTINUED | OUTPATIENT
Start: 2023-09-19 | End: 2023-09-21

## 2023-09-19 RX ORDER — DAPTOMYCIN 500 MG/10ML
500 INJECTION, POWDER, LYOPHILIZED, FOR SOLUTION INTRAVENOUS EVERY 24 HOURS
Refills: 0 | Status: DISCONTINUED | OUTPATIENT
Start: 2023-09-20 | End: 2023-09-21

## 2023-09-19 RX ADMIN — Medication 650 MILLIGRAM(S): at 07:01

## 2023-09-19 RX ADMIN — Medication 650 MILLIGRAM(S): at 13:13

## 2023-09-19 RX ADMIN — Medication 650 MILLIGRAM(S): at 23:22

## 2023-09-19 RX ADMIN — Medication 650 MILLIGRAM(S): at 14:20

## 2023-09-19 RX ADMIN — Medication 650 MILLIGRAM(S): at 18:34

## 2023-09-19 RX ADMIN — Medication 250 MILLIGRAM(S): at 07:01

## 2023-09-19 RX ADMIN — Medication 15 MILLIGRAM(S): at 05:34

## 2023-09-19 RX ADMIN — Medication 300 MILLIGRAM(S): at 00:28

## 2023-09-19 RX ADMIN — DAPTOMYCIN 500 MILLIGRAM(S): 500 INJECTION, POWDER, LYOPHILIZED, FOR SOLUTION INTRAVENOUS at 17:19

## 2023-09-19 RX ADMIN — Medication 15 MILLIGRAM(S): at 06:34

## 2023-09-19 RX ADMIN — Medication 650 MILLIGRAM(S): at 19:02

## 2023-09-19 NOTE — DISCHARGE NOTE PROVIDER - PROVIDER TOKENS
PROVIDER:[TOKEN:[[id number]],ESTABLISHEDPATIENT:[T]] PROVIDER:[TOKEN:[[id number]],SCHEDULEDAPPT:[09/27/2023],SCHEDULEDAPPTTIME:[02:30 PM],ESTABLISHEDPATIENT:[T]]

## 2023-09-19 NOTE — PROGRESS NOTE ADULT - ATTENDING COMMENTS
plan as above - abx changed from vanc- to dapto 9/19   will likely be a dc pending improvement in 24 - 48 hrs plan as above - abx changed from vanc- to dapto 9/19   anemia: hgb with slight down trend- will advise outpt fu with anemia panel and work up   hyponatremia : now resolved with IVF   will likely be a dc pending improvement in 24 - 48 hrs

## 2023-09-19 NOTE — PROGRESS NOTE ADULT - SUBJECTIVE AND OBJECTIVE BOX
INFECTIOUS DISEASES CONSULT FOLLOW-UP NOTE    INTERVAL HPI/OVERNIGHT EVENTS:      ROS:   Constitutional, eyes, ENT, cardiovascular, respiratory, gastrointestinal, genitourinary, integumentary, neurological, psychiatric and heme/lymph are otherwise negative other than noted above       ANTIBIOTICS/RELEVANT:    MEDICATIONS  (STANDING):  acetaminophen     Tablet .. 650 milliGRAM(s) Oral every 6 hours  influenza   Vaccine 0.5 milliLiter(s) IntraMuscular once  vancomycin  IVPB 1500 milliGRAM(s) IV Intermittent every 12 hours    MEDICATIONS  (PRN):  ketorolac   Injectable 15 milliGRAM(s) IV Push every 6 hours PRN Severe Pain (7 - 10)        Vital Signs Last 24 Hrs  T(C): 37.1 (18 Sep 2023 20:50), Max: 37.1 (18 Sep 2023 09:10)  T(F): 98.7 (18 Sep 2023 20:50), Max: 98.8 (18 Sep 2023 09:10)  HR: 56 (18 Sep 2023 20:50) (56 - 57)  BP: 117/72 (18 Sep 2023 20:50) (109/67 - 127/77)  BP(mean): --  RR: 18 (18 Sep 2023 20:50) (17 - 18)  SpO2: 99% (18 Sep 2023 20:50) (96% - 99%)    Parameters below as of 18 Sep 2023 20:50  Patient On (Oxygen Delivery Method): room air        PHYSICAL EXAM:  Constitutional: alert, NAD  Eyes: the sclera and conjunctiva were normal.   ENT: the ears and nose were normal in appearance.   Neck: the appearance of the neck was normal and the neck was supple.   Pulmonary: no respiratory distress and lungs were clear to auscultation bilaterally.   Heart: heart rate was normal and rhythm regular, normal S1 and S2  Vascular:. there was no peripheral edema  Abdomen: normal bowel sounds, soft, non-tender  Neurological: no focal deficits.   Psychiatric: the affect was normal        LABS:                        12.2   6.36  )-----------( 173      ( 18 Sep 2023 05:30 )             37.9     09-18    134<L>  |  101  |  12  ----------------------------<  115<H>  3.8   |  26  |  0.89    Ca    8.6      18 Sep 2023 05:30  Phos  2.2     09-18  Mg     1.9     09-18    TPro  5.9<L>  /  Alb  3.4  /  TBili  0.3  /  DBili  x   /  AST  12  /  ALT  7<L>  /  AlkPhos  82  09-18      Urinalysis Basic - ( 18 Sep 2023 05:30 )    Color: x / Appearance: x / SG: x / pH: x  Gluc: 115 mg/dL / Ketone: x  / Bili: x / Urobili: x   Blood: x / Protein: x / Nitrite: x   Leuk Esterase: x / RBC: x / WBC x   Sq Epi: x / Non Sq Epi: x / Bacteria: x        MICROBIOLOGY:      RADIOLOGY & ADDITIONAL STUDIES:  Reviewed

## 2023-09-19 NOTE — PROGRESS NOTE ADULT - SUBJECTIVE AND OBJECTIVE BOX
OVERNIGHT EVENTS: BRANDON    SUBJECTIVE / INTERVAL HPI: Patient seen and examined at bedside. States his pain is well controlled and swelling has improved but still with erythema. Denies any fevers or chills.    VITAL SIGNS:  Vital Signs Last 24 Hrs  T(C): 36.4 (19 Sep 2023 10:03), Max: 37.1 (18 Sep 2023 15:05)  T(F): 97.5 (19 Sep 2023 10:03), Max: 98.8 (18 Sep 2023 15:05)  HR: 55 (19 Sep 2023 10:03) (55 - 56)  BP: 126/74 (19 Sep 2023 10:03) (117/72 - 127/77)  BP(mean): --  RR: 16 (19 Sep 2023 10:03) (16 - 18)  SpO2: 97% (19 Sep 2023 10:03) (97% - 99%)    Parameters below as of 19 Sep 2023 10:03  Patient On (Oxygen Delivery Method): room air        PHYSICAL EXAM:    General: alert, in no acute distress  HEENT: NC/AT; PERRL, anicteric sclera; MMM  Neck: supple  Cardiovascular: +S1/S2, RRR  Respiratory: CTA B/L; no W/R/R  Gastrointestinal: soft, NT/ND; +BSx4  Extremities: WWP; + erythema and edema over L 4th digit with induration between PIP and DIP, ROM limited by pain and edema  Vascular: 2+ radial, DP/PT pulses B/L  Neurological: AAOx3; no focal deficits    MEDICATIONS:  MEDICATIONS  (STANDING):  acetaminophen     Tablet .. 650 milliGRAM(s) Oral every 6 hours  influenza   Vaccine 0.5 milliLiter(s) IntraMuscular once  vancomycin  IVPB 1500 milliGRAM(s) IV Intermittent every 12 hours    MEDICATIONS  (PRN):  ketorolac   Injectable 15 milliGRAM(s) IV Push every 6 hours PRN Severe Pain (7 - 10)      ALLERGIES:  Allergies    No Known Allergies    Intolerances        LABS:                        12.2   5.45  )-----------( 179      ( 19 Sep 2023 05:30 )             38.1     09-19    138  |  105  |  15  ----------------------------<  114<H>  3.8   |  27  |  1.14    Ca    9.0      19 Sep 2023 05:30  Phos  3.8     09-19  Mg     1.9     09-19    TPro  5.8<L>  /  Alb  3.3  /  TBili  0.2  /  DBili  x   /  AST  11  /  ALT  7<L>  /  AlkPhos  86  09-19      Urinalysis Basic - ( 19 Sep 2023 05:30 )    Color: x / Appearance: x / SG: x / pH: x  Gluc: 114 mg/dL / Ketone: x  / Bili: x / Urobili: x   Blood: x / Protein: x / Nitrite: x   Leuk Esterase: x / RBC: x / WBC x   Sq Epi: x / Non Sq Epi: x / Bacteria: x      CAPILLARY BLOOD GLUCOSE          RADIOLOGY & ADDITIONAL TESTS: Reviewed.

## 2023-09-19 NOTE — DISCHARGE NOTE PROVIDER - NSDCMRMEDTOKEN_GEN_ALL_CORE_FT
linezolid 600 mg oral tablet: 1 tab(s) orally 2 times a day   acetaminophen 325 mg oral tablet: 2 tab(s) orally every 6 hours  linezolid 600 mg oral tablet: 1 tab(s) orally 2 times a day   acetaminophen 325 mg oral tablet: 2 tab(s) orally every 6 hours as needed for  pain  linezolid 600 mg oral tablet: 1 tab(s) orally 2 times a day

## 2023-09-19 NOTE — PROGRESS NOTE ADULT - PROBLEM SELECTOR PLAN 1
Patient with L 4th finger cellulitis, s/p I&D at OSH with no improvement. 0/4 SIRS criteria, no leukocytosis or fever. No signs of necrotizing fascitis as pain is localized over 4th finger and dorsal aspect of hand.   - Nasal swab + MRSA   - wound cx + MRSA  - bcx - NGTD x24 hours     Plan:  - c/w vancomycin 1250 mg IV redosed with trough  - pain control tylenol and Toradol   - elevation of the affected limb  - f/u ID recs

## 2023-09-19 NOTE — PROGRESS NOTE ADULT - ASSESSMENT
IMPRESSION:  Cellulitis and abscess of the left hand secondary to staph aureus, likely MRSA.  Patient has had minimal improvement of vancomycin, likely secondary to subtherapeutic doses    Recommend:  1.  Stop Vancomycin  2.  Start Daptomycin 500 mg IV daily  3.  Check CK level in AM  4.  Follow up susceptibilities.  Anticipate conversion to PO antibiotics on 9/20, provided he has shown clinical improvement    ID team 1 will follow

## 2023-09-19 NOTE — PROGRESS NOTE ADULT - SUBJECTIVE AND OBJECTIVE BOX
INTERVAL HPI/OVERNIGHT EVENTS:    Patient was seen and examined at bedside.  Has had little improvement.  Still with pain, swelling and discharge    CONSTITUTIONAL:  Negative fever or chills, feels well, good appetite  EYES:  Negative  blurry vision or double vision  CARDIOVASCULAR:  Negative for chest pain or palpitations  RESPIRATORY:  Negative for cough, wheezing, or SOB   GASTROINTESTINAL:  Negative for nausea, vomiting, diarrhea, constipation, or abdominal pain  GENITOURINARY:  Negative frequency, urgency or dysuria  NEUROLOGIC:  No headache, confusion, dizziness, lightheadedness      ANTIBIOTICS/RELEVANT:    MEDICATIONS  (STANDING):  acetaminophen     Tablet .. 650 milliGRAM(s) Oral every 6 hours  influenza   Vaccine 0.5 milliLiter(s) IntraMuscular once  vancomycin  IVPB 1500 milliGRAM(s) IV Intermittent every 12 hours    MEDICATIONS  (PRN):  ketorolac   Injectable 15 milliGRAM(s) IV Push every 6 hours PRN Severe Pain (7 - 10)        Vital Signs Last 24 Hrs  T(C): 36.4 (19 Sep 2023 10:03), Max: 37.1 (18 Sep 2023 15:05)  T(F): 97.5 (19 Sep 2023 10:03), Max: 98.8 (18 Sep 2023 15:05)  HR: 55 (19 Sep 2023 10:03) (55 - 56)  BP: 126/74 (19 Sep 2023 10:03) (117/72 - 127/77)  BP(mean): --  RR: 16 (19 Sep 2023 10:03) (16 - 18)  SpO2: 97% (19 Sep 2023 10:03) (97% - 99%)    Parameters below as of 19 Sep 2023 10:03  Patient On (Oxygen Delivery Method): room air        PHYSICAL EXAM:  Constitutional: non-toxic, no distress  Eyes:ROHIT, EOMI  Ear/Nose/Throat: no oral lesion, no sinus tenderness on percussion	  Neck:  supple  Respiratory: CTA george  Cardiovascular: S1S2 RRR, no murmurs  Gastrointestinal:soft, (+) BS, no HSM  Extremities:  left 4th finger with erythema, swelling and discharge  Vascular: DP Pulse:	right normal; left normal      LABS:                        12.2   5.45  )-----------( 179      ( 19 Sep 2023 05:30 )             38.1     09-19    138  |  105  |  15  ----------------------------<  114<H>  3.8   |  27  |  1.14    Ca    9.0      19 Sep 2023 05:30  Phos  3.8     09-19  Mg     1.9     09-19    TPro  5.8<L>  /  Alb  3.3  /  TBili  0.2  /  DBili  x   /  AST  11  /  ALT  7<L>  /  AlkPhos  86  09-19      Urinalysis Basic - ( 19 Sep 2023 05:30 )    Color: x / Appearance: x / SG: x / pH: x  Gluc: 114 mg/dL / Ketone: x  / Bili: x / Urobili: x   Blood: x / Protein: x / Nitrite: x   Leuk Esterase: x / RBC: x / WBC x   Sq Epi: x / Non Sq Epi: x / Bacteria: x        MICROBIOLOGY:    Culture - Abscess with Gram Stain (09.18.23 @ 12:14)    Gram Stain:   No organisms seen  Few WBC's   Specimen Source: .Abscess Arm - Left   Culture Results:   Moderate to Numerous Staphylococcus aureus  presumptive Methicillin resistant  Confirmation to follow within 24 hours  Susceptibility to follow.  Result called to and read back bySo ALVARES RN  09/19/2023 09:20:30        RADIOLOGY & ADDITIONAL STUDIES:

## 2023-09-19 NOTE — DISCHARGE NOTE PROVIDER - CARE PROVIDER_API CALL
Erin Sandra  Internal Medicine  [address]  [place] [address]  Phone: [phone]  Fax: [phone]  Established Patient  Follow Up Time:    Erin Sandra  Internal Medicine  [address]  [place] [address]  Phone: [phone]  Fax: [phone]  Established Patient  Scheduled Appointment: 09/27/2023 02:30 PM

## 2023-09-19 NOTE — DISCHARGE NOTE PROVIDER - CARE PROVIDERS DIRECT ADDRESSES
[email or web address]

## 2023-09-19 NOTE — DISCHARGE NOTE PROVIDER - HOSPITAL COURSE
#Discharge: do not delete  29 y/o M w/ no PMHx who presented to the ED with worsening finger pain and swelling. He was seen at [facility] prior to admission for 2 day history of L 4th finger pain, erythema and edema. s/p I&D with no symptomatic relief. XR L hand showed mild tissue swelling, no fracture or OM. Started on IV vanc and zosyn and was planned for admission. Now admitted to [facility] for medical management of cellulitis.    Problem List/Main Diagnoses (system-based):     #Cellulitis of hand, left.   Patient with L 4th finger cellulitis, s/p I&D at OSH with no improvement. 0/4 SIRS criteria, no leukocytosis or fever. No signs of necrotizing fascitis as pain is localized over 4th finger and dorsal aspect of hand.   - Nasal swab + MRSA   - wound cx + MRSA  - bcx - NGTD x24 hours     Plan:  - c/w vancomycin 1250 mg IV redosed with trough  - pain control tylenol and Toradol   - elevation of the affected limb  - f/u ID recs.      Inpatient treatment course:         Patient was discharged to: (home/[facility]/acute rehab/hospice, etc, and with what services – home health PT/RN? Home O2?)         New medications:    Changes to old medications:    Medications that were stopped:         Items to follow up as outpatient:         Physical exam at the time of discharge: #Discharge: do not delete  29 y/o M w/ no PMHx who presented to the ED with worsening finger pain and swelling. He was seen at [facility] prior to admission for 2 day history of L 4th finger pain, erythema and edema. s/p I&D at OSH with no symptomatic relief. XR L hand showed mild tissue swelling, no fracture or OM. Seen by hand surgeon who recommended no additional surgical intervention intervention. Wound cx positive for MRSA. Admitted for antibiotic therapy with IV Vancomycin.     Problem List/Main Diagnoses (system-based):     #Cellulitis of hand, left.   Patient with L 4th finger cellulitis, s/p I&D at OSH with no improvement. 0/4 SIRS criteria, no leukocytosis or fever. No signs of necrotizing fascitis.  - Nasal swab + MRSA   - wound cx + MRSA  - bcx - NGTD   - c/w vancomycin   - pain control tylenol and Toradol   - elevation of the affected limb    Inpatient treatment course:    Patient was discharged to: home     New medications:    Changes to old medications: None     Medications that were stopped: None          Items to follow up as outpatient: f/u with PCP         Physical exam at the time of discharge: #Discharge: do not delete  29 y/o M w/ no PMHx who presented to the ED with worsening finger pain and swelling. He was seen at [facility] prior to admission for 2 day history of L 4th finger pain, erythema and edema. s/p I&D at OSH with no symptomatic relief. XR L hand showed mild tissue swelling, no fracture or OM. Seen by hand surgeon who recommended no additional surgical intervention intervention. Wound cx positive for MRSA. Admitted for antibiotic therapy with IV Vancomycin.     Problems:    #Cellulitis of hand, left.   Patient with L 4th finger cellulitis, s/p I&D at OSH with no improvement. 0/4 SIRS criteria, no leukocytosis or fever. No signs of necrotizing fascitis. During this hospitalization ID workup: Nasal swab + MRSA, wound cx + MRSA. Bcx x2 - NGTD x2days. Pt was treated with vancomycin initially and then switched to daptomycin due to rise in creatinine level  0.72->1.14 and low levels of vanc. For pain control he was treated with tylenol and Toradol and encouraged to keep affected limb elevated. He remained afebrile, wbc wnl.     Plan:   -On discharge, continue with linezolid 600mg BID for 7 days  -F/u at [facility] at scheduled appointment on 9/27      Inpatient treatment course:    Patient was discharged to: home     New medications: Linezolid 600mg BID for 7 days     Changes to old medications: None     Medications that were stopped: None          Items to follow up as outpatient: f/u with PCP     29 y/o M w/ no PMHx who presented to the ED with worsening finger pain and swelling. He was seen at [facility] prior to admission for 2 day history of L 4th finger pain, erythema and edema. s/p I&D at OSH with no symptomatic relief. XR L hand showed mild tissue swelling, no fracture or OM. Seen by hand surgeon who recommended no additional surgical intervention intervention. Wound cx positive for MRSA. Admitted for antibiotic therapy with IV Vancomycin.     Problems:    #Cellulitis of hand, left.   Patient with L 4th finger cellulitis, s/p I&D at OSH with no improvement. 0/4 SIRS criteria, no leukocytosis or fever. No signs of necrotizing fascitis. During this hospitalization ID workup: Nasal swab + MRSA, wound cx + MRSA. Bcx x2 - NGTD x2days. Pt was treated with vancomycin initially and then switched to daptomycin due to rise in creatinine level  0.72->1.14 and low levels of vanc. For pain control he was treated with tylenol and Toradol and encouraged to keep affected limb elevated. He remained afebrile, wbc wnl.     Plan:   -On discharge, continue with linezolid 600mg BID for 7 days  -F/u at [facility] at scheduled appointment on 9/27      Inpatient treatment course:    Patient was discharged to: home     New medications: Linezolid 600mg BID for 7 days     Changes to old medications: None     Medications that were stopped: None          Items to follow up as outpatient: f/u with PCP     27 y/o M w/ no PMHx who presented to the ED with worsening finger pain and swelling. He was seen at [facility] prior to admission for 2 day history of L 4th finger pain, erythema and edema. s/p I&D at OSH with no symptomatic relief. XR L hand showed mild tissue swelling, no fracture or OM. Seen by hand surgeon who recommended no additional surgical intervention intervention. Wound cx positive for MRSA. Admitted for antibiotic therapy with IV Vancomycin.     Problems:    #Cellulitis of hand, left.   Patient with L 4th finger cellulitis, s/p I&D at OSH with no improvement. 0/4 SIRS criteria, no leukocytosis or fever. No signs of necrotizing fascitis. During this hospitalization ID workup: Nasal swab + MRSA, wound cx + MRSA. Bcx x2 - NGTD x2days. Pt was treated with vancomycin initially and then switched to daptomycin due to rise in creatinine level  0.72->1.14 and low levels of vanc. For pain control he was treated with tylenol and Toradol and encouraged to keep affected limb elevated. He remained afebrile, wbc wnl.     Plan:   -On discharge, continue with linezolid 600mg BID for 7 days   -continue with OTC Tylenol for pain   -F/u at [facility] at scheduled appointment on 9/27      Inpatient treatment course:    Patient was discharged to: home     New medications: Linezolid 600mg BID for 7 days     Changes to old medications: None     Medications that were stopped: None          Items to follow up as outpatient: f/u with PCP     27 y/o M w/ no PMHx who presented to the ED with worsening finger pain and swelling. He was seen at [facility] prior to admission for 2 day history of L 4th finger pain, erythema and edema. s/p I&D at OSH with no symptomatic relief. XR L hand showed mild tissue swelling, no fracture or OM. Seen by hand surgeon who recommended no additional surgical intervention intervention. Wound cx positive for MRSA. Admitted for antibiotic therapy with IV Vancomycin.     Problems:    #Cellulitis of hand, left.   Patient with L 4th finger cellulitis and abscess , s/p I&D at OSH with no improvement. 0/4 SIRS criteria, no leukocytosis or fever. No signs of necrotizing fascitis. During this hospitalization ID workup: Nasal swab + MRSA, wound cx + MRSA. Bcx x2 - NGTD x2days. Pt was treated with vancomycin initially and then switched to daptomycin due to rise in creatinine level  0.72->1.14 and low levels of vanc. For pain control he was treated with tylenol and Toradol and encouraged to keep affected limb elevated. He remained afebrile, wbc wnl.     Plan:   -On discharge, continue with linezolid 600mg BID for 7 days   -continue with OTC Tylenol for pain   -F/u at [facility] at scheduled appointment on 9/27      Inpatient treatment course:    Patient was discharged to: home     New medications: Linezolid 600mg BID for 7 days     Changes to old medications: None     Medications that were stopped: None          Items to follow up as outpatient: f/u with PCP

## 2023-09-19 NOTE — DISCHARGE NOTE PROVIDER - NSDCCPCAREPLAN_GEN_ALL_CORE_FT
PRINCIPAL DISCHARGE DIAGNOSIS  Diagnosis: Cellulitis of finger of left hand  Assessment and Plan of Treatment: Cellulitis is an infection of the skin. This infection can cause redness, pain, and swelling. Cellulitis tends to affect deep layers of skin and sometimes the fat under the skin. This condition can happen when germs get into the skin. Normally, different types of germs live on a person's skin, and mostly these germs do not cause any problems. But if a person gets a cut or break in the skin, the germs can penetrate and cause an infection. Certain conditions can increase a person's chance of getting cellulitis. These include: having a cut (even a tiny one), having another type of skin infection or a long-term skin condition, swelling of the skin or swelling in the body, and being overweight. You were found to be infected with MRSA bacteria and treated with IV antibiotics. PLEASE CONTINUE TAKING _________________________________.  Additionally, it is important to avoid using tools that have not been disinfected to pick at you skin. If  your symptoms worsen, have fever or pain please seek medical attention.     PRINCIPAL DISCHARGE DIAGNOSIS  Diagnosis: Cellulitis of finger of left hand  Assessment and Plan of Treatment: Cellulitis is an infection of the skin. This infection can cause redness, pain, and swelling. Cellulitis tends to affect deep layers of skin and sometimes the fat under the skin. This condition can happen when germs get into the skin. Normally, different types of germs live on a person's skin, and mostly these germs do not cause any problems. But if a person gets a cut or break in the skin, the germs can penetrate and cause an infection. Certain conditions can increase a person's chance of getting cellulitis. These include: having a cut (even a tiny one), having another type of skin infection or a long-term skin condition, swelling of the skin or swelling in the body, and being overweight. You were found to be infected with MRSA bacteria and treated with IV antibiotics. PLEASE CONTINUE TAKING Linezolid antibiotics two times per day, for 7 days as prescribed.   Additionally, it is important to avoid using tools that have not been disinfected to pick at you skin. If  your symptoms worsen, have fever or pain please seek medical attention.     PRINCIPAL DISCHARGE DIAGNOSIS  Diagnosis: Cellulitis of finger of left hand  Assessment and Plan of Treatment: Cellulitis is an infection of the skin. This infection can cause redness, pain, and swelling. Cellulitis tends to affect deep layers of skin and sometimes the fat under the skin. This condition can happen when germs get into the skin. Normally, different types of germs live on a person's skin, and mostly these germs do not cause any problems. But if a person gets a cut or break in the skin, the germs can penetrate and cause an infection. Certain conditions can increase a person's chance of getting cellulitis. These include: having a cut (even a tiny one), having another type of skin infection or a long-term skin condition, swelling of the skin or swelling in the body, and being overweight. You were found to be infected with MRSA bacteria and treated with IV antibiotics. PLEASE CONTINUE TAKING Linezolid antibiotics two times per day, for 7 days as prescribed. For management of pain, you can take Tylenol 325 mg, 2 tablets every 6 hours as needed.   Additionally, it is important to avoid using tools that have not been disinfected to pick at you skin. If  your symptoms worsen, have fever or pain please seek medical attention.

## 2023-09-19 NOTE — DISCHARGE NOTE PROVIDER - ATTENDING DISCHARGE PHYSICAL EXAMINATION:
Physical exam:  GENERAL: NAD, lying in bed comfortably  HEAD:  Atraumatic, Normocephalic  EYES: EOMI, PERRLA, conjunctiva and sclera clear  ENT: Moist mucous membranes  NECK: Supple, No JVD  CHEST/LUNG: Clear to auscultation bilaterally; No rales, rhonchi, wheezing, or rubs.  HEART: Regular rate and rhythm; S1+ S2+   ABDOMEN: Bowel sounds present; Soft, Nontender, Nondistended   EXTREMITIES:  2+ Peripheral Pulses, brisk capillary refill. No clubbing, cyanosis, or edema  NERVOUS SYSTEM:  Alert & Oriented , speech clear   MSK: FROM all 4 extremities, full and equal strength  SKIN: left 4 th digit cellulitis and edema- improving    Left 4 th digit cellulitis and abscess s/p i&d -- cultures growing MRSA   abx changed from vanc- to dapto 9/19 - will dc on linezolid for 7 days 9/21  anemia: hgb with slight down trend- will advise outpt fu with anemia panel and work up   hyponatremia : now resolved with IVF   medically ready for dc-

## 2023-09-20 LAB
-  CLINDAMYCIN: SIGNIFICANT CHANGE UP
-  DAPTOMYCIN: SIGNIFICANT CHANGE UP
-  ERYTHROMYCIN: SIGNIFICANT CHANGE UP
-  LINEZOLID: SIGNIFICANT CHANGE UP
-  OXACILLIN: SIGNIFICANT CHANGE UP
-  RIFAMPIN: SIGNIFICANT CHANGE UP
-  TETRACYCLINE: SIGNIFICANT CHANGE UP
-  TRIMETHOPRIM/SULFAMETHOXAZOLE: SIGNIFICANT CHANGE UP
-  VANCOMYCIN: SIGNIFICANT CHANGE UP
ALBUMIN SERPL ELPH-MCNC: 3.8 G/DL — SIGNIFICANT CHANGE UP (ref 3.3–5)
ALP SERPL-CCNC: 93 U/L — SIGNIFICANT CHANGE UP (ref 40–120)
ALT FLD-CCNC: 12 U/L — SIGNIFICANT CHANGE UP (ref 10–45)
ANION GAP SERPL CALC-SCNC: 8 MMOL/L — SIGNIFICANT CHANGE UP (ref 5–17)
AST SERPL-CCNC: 12 U/L — SIGNIFICANT CHANGE UP (ref 10–40)
BASOPHILS # BLD AUTO: 0.02 K/UL — SIGNIFICANT CHANGE UP (ref 0–0.2)
BASOPHILS NFR BLD AUTO: 0.4 % — SIGNIFICANT CHANGE UP (ref 0–2)
BILIRUB SERPL-MCNC: 0.2 MG/DL — SIGNIFICANT CHANGE UP (ref 0.2–1.2)
BUN SERPL-MCNC: 18 MG/DL — SIGNIFICANT CHANGE UP (ref 7–23)
CALCIUM SERPL-MCNC: 9.5 MG/DL — SIGNIFICANT CHANGE UP (ref 8.4–10.5)
CHLORIDE SERPL-SCNC: 102 MMOL/L — SIGNIFICANT CHANGE UP (ref 96–108)
CK SERPL-CCNC: 80 U/L — SIGNIFICANT CHANGE UP (ref 30–200)
CO2 SERPL-SCNC: 27 MMOL/L — SIGNIFICANT CHANGE UP (ref 22–31)
CREAT SERPL-MCNC: 0.97 MG/DL — SIGNIFICANT CHANGE UP (ref 0.5–1.3)
CULTURE RESULTS: SIGNIFICANT CHANGE UP
EGFR: 102 ML/MIN/1.73M2 — SIGNIFICANT CHANGE UP
EOSINOPHIL # BLD AUTO: 0.18 K/UL — SIGNIFICANT CHANGE UP (ref 0–0.5)
EOSINOPHIL NFR BLD AUTO: 3.7 % — SIGNIFICANT CHANGE UP (ref 0–6)
GLUCOSE SERPL-MCNC: 98 MG/DL — SIGNIFICANT CHANGE UP (ref 70–99)
HCT VFR BLD CALC: 41.8 % — SIGNIFICANT CHANGE UP (ref 39–50)
HGB BLD-MCNC: 13.5 G/DL — SIGNIFICANT CHANGE UP (ref 13–17)
IMM GRANULOCYTES NFR BLD AUTO: 0 % — SIGNIFICANT CHANGE UP (ref 0–0.9)
LYMPHOCYTES # BLD AUTO: 2.09 K/UL — SIGNIFICANT CHANGE UP (ref 1–3.3)
LYMPHOCYTES # BLD AUTO: 42.6 % — SIGNIFICANT CHANGE UP (ref 13–44)
MAGNESIUM SERPL-MCNC: 1.9 MG/DL — SIGNIFICANT CHANGE UP (ref 1.6–2.6)
MCHC RBC-ENTMCNC: 30.4 PG — SIGNIFICANT CHANGE UP (ref 27–34)
MCHC RBC-ENTMCNC: 32.3 GM/DL — SIGNIFICANT CHANGE UP (ref 32–36)
MCV RBC AUTO: 94.1 FL — SIGNIFICANT CHANGE UP (ref 80–100)
METHOD TYPE: SIGNIFICANT CHANGE UP
METHOD TYPE: SIGNIFICANT CHANGE UP
MONOCYTES # BLD AUTO: 0.54 K/UL — SIGNIFICANT CHANGE UP (ref 0–0.9)
MONOCYTES NFR BLD AUTO: 11 % — SIGNIFICANT CHANGE UP (ref 2–14)
NEUTROPHILS # BLD AUTO: 2.08 K/UL — SIGNIFICANT CHANGE UP (ref 1.8–7.4)
NEUTROPHILS NFR BLD AUTO: 42.3 % — LOW (ref 43–77)
NRBC # BLD: 0 /100 WBCS — SIGNIFICANT CHANGE UP (ref 0–0)
ORGANISM # SPEC MICROSCOPIC CNT: SIGNIFICANT CHANGE UP
PHOSPHATE SERPL-MCNC: 3.4 MG/DL — SIGNIFICANT CHANGE UP (ref 2.5–4.5)
PLATELET # BLD AUTO: 229 K/UL — SIGNIFICANT CHANGE UP (ref 150–400)
POTASSIUM SERPL-MCNC: 4.3 MMOL/L — SIGNIFICANT CHANGE UP (ref 3.5–5.3)
POTASSIUM SERPL-SCNC: 4.3 MMOL/L — SIGNIFICANT CHANGE UP (ref 3.5–5.3)
PROT SERPL-MCNC: 7 G/DL — SIGNIFICANT CHANGE UP (ref 6–8.3)
RBC # BLD: 4.44 M/UL — SIGNIFICANT CHANGE UP (ref 4.2–5.8)
RBC # FLD: 11.8 % — SIGNIFICANT CHANGE UP (ref 10.3–14.5)
SODIUM SERPL-SCNC: 137 MMOL/L — SIGNIFICANT CHANGE UP (ref 135–145)
SPECIMEN SOURCE: SIGNIFICANT CHANGE UP
WBC # BLD: 4.91 K/UL — SIGNIFICANT CHANGE UP (ref 3.8–10.5)
WBC # FLD AUTO: 4.91 K/UL — SIGNIFICANT CHANGE UP (ref 3.8–10.5)

## 2023-09-20 PROCEDURE — 99233 SBSQ HOSP IP/OBS HIGH 50: CPT | Mod: GC

## 2023-09-20 PROCEDURE — 99232 SBSQ HOSP IP/OBS MODERATE 35: CPT

## 2023-09-20 RX ORDER — ACETAMINOPHEN 500 MG
2 TABLET ORAL
Qty: 0 | Refills: 0 | DISCHARGE
Start: 2023-09-20

## 2023-09-20 RX ADMIN — Medication 650 MILLIGRAM(S): at 12:30

## 2023-09-20 RX ADMIN — Medication 650 MILLIGRAM(S): at 00:22

## 2023-09-20 RX ADMIN — DAPTOMYCIN 120 MILLIGRAM(S): 500 INJECTION, POWDER, LYOPHILIZED, FOR SOLUTION INTRAVENOUS at 14:54

## 2023-09-20 RX ADMIN — Medication 650 MILLIGRAM(S): at 07:01

## 2023-09-20 RX ADMIN — Medication 650 MILLIGRAM(S): at 20:00

## 2023-09-20 RX ADMIN — Medication 650 MILLIGRAM(S): at 19:27

## 2023-09-20 RX ADMIN — Medication 650 MILLIGRAM(S): at 11:53

## 2023-09-20 NOTE — PROGRESS NOTE ADULT - PROVIDER SPECIALTY LIST ADULT
Infectious Disease
Infectious Disease
Internal Medicine
Infectious Disease
Internal Medicine
Internal Medicine

## 2023-09-20 NOTE — PROGRESS NOTE ADULT - ATTENDING COMMENTS
Patient with cellulitis and abscess of left hand secondary to MRSA.  He is clinically improved on Daptomycin.  Can continue Daptomycin now.  Check CK level.   Patient wishes to go home on 9/21.  That's ok from ID standpoint.  Can discharge with Linezolid 600 mg PO q12 x 7 days.  Please make sure he has access to Linezolid. If he doesn't clindamycin is a reasonable alternative.

## 2023-09-20 NOTE — PROGRESS NOTE ADULT - ASSESSMENT
39 y/o M w/ no PMHx who presented to the ED with worsening finger pain and swelling, admitted for treatment of cellulitis of L 4th finger with IV antibiotics

## 2023-09-20 NOTE — PROGRESS NOTE ADULT - ATTENDING COMMENTS
plan as above - abx changed from vanc- to dapto 9/19 - will dc on linezolid for 7 days 9/21  anemia: hgb with slight down trend- will advise outpt fu with anemia panel and work up   hyponatremia : now resolved with IVF   medically ready for dc- requesting to leave in am - will provide with dc notice

## 2023-09-20 NOTE — PROGRESS NOTE ADULT - ASSESSMENT
IMPRESSION:  Cellulitis and abscess of the left hand secondary to staph aureus, likely MRSA.  Patient has had minimal improvement of vancomycin, likely secondary to subtherapeutic doses    Recommend:  1. c/w Daptomycin 500 mg IV today (unless CK level comes back high)  2.  Check CK level today  3. Upon discharge give additional 7 days of PO Linezolid 600 BID      INCOMPLETE IMPRESSION:  Cellulitis and abscess of the left hand secondary to staph aureus, likely MRSA.  Patient has had minimal improvement of vancomycin, likely secondary to subtherapeutic doses. Transitioned to Dapto with improvement in exam.    Recommend:  1. c/w Daptomycin 500 mg IV today (unless CK level comes back high)  2.  Check CK level today  3. Upon discharge give additional 7 days of PO Linezolid 600 BID      Discussed with Attending, Dr. Jarquin. Not final until signed by Attending. IMPRESSION:  Cellulitis and abscess of the left hand secondary to staph aureus, likely MRSA.  Patient has had minimal improvement of vancomycin, likely secondary to subtherapeutic doses. Transitioned to Dapto with improvement in exam.    Recommend:  1. c/w Daptomycin 500 mg IV today (unless CK level comes back high)  2.  Check CK level today  3. Upon discharge give additional 7 days of PO Linezolid 600 BID    Will sign off at this time.    Discussed with Attending, Dr. Jarquin. Not final until signed by Attending.

## 2023-09-20 NOTE — CHART NOTE - NSCHARTNOTEFT_GEN_A_CORE
Pt identified on food insecurity screen. RD provided education on food resources in community, handout provided. RD to remain available.

## 2023-09-20 NOTE — SBIRT NOTE ADULT - NSSBIRTNALOXDUR_GEN_A_CORE
2 minutes. Patient declined to complete screening stating "I don't drink like that". SW offered resources and patient declined.

## 2023-09-20 NOTE — PROGRESS NOTE ADULT - SUBJECTIVE AND OBJECTIVE BOX
**INCOMPLETE NOTE    OVERNIGHT EVENTS:    SUBJECTIVE:  Patient seen and examined at bedside.    Vital Signs Last 12 Hrs  T(F): 98.2 (09-20-23 @ 06:39), Max: 98.2 (09-20-23 @ 06:39)  HR: 59 (09-20-23 @ 06:39) (56 - 59)  BP: 125/70 (09-20-23 @ 06:39) (125/70 - 140/79)  BP(mean): --  RR: 16 (09-20-23 @ 06:39) (16 - 18)  SpO2: 97% (09-20-23 @ 06:39) (97% - 97%)  I&O's Summary      PHYSICAL EXAM:  Constitutional: NAD, comfortable in bed.  HEENT: NC/AT, PERRLA, EOMI, no conjunctival pallor or scleral icterus, MMM  Neck: Supple, no JVD  Respiratory: CTA B/L. No w/r/r.   Cardiovascular: RRR, normal S1 and S2, no m/r/g.   Gastrointestinal: +BS, soft NTND, no guarding or rebound tenderness, no palpable masses   Extremities: wwp; no cyanosis, clubbing or edema.   Vascular: Pulses equal and strong throughout.   Neurological: AAOx3, no CN deficits, strength and sensation intact throughout.   Skin: No gross skin abnormalities or rashes        LABS:                        12.2   5.45  )-----------( 179      ( 19 Sep 2023 05:30 )             38.1     09-19    138  |  105  |  15  ----------------------------<  114<H>  3.8   |  27  |  1.14    Ca    9.0      19 Sep 2023 05:30  Phos  3.8     09-19  Mg     1.9     09-19    TPro  5.8<L>  /  Alb  3.3  /  TBili  0.2  /  DBili  x   /  AST  11  /  ALT  7<L>  /  AlkPhos  86  09-19      Urinalysis Basic - ( 19 Sep 2023 05:30 )    Color: x / Appearance: x / SG: x / pH: x  Gluc: 114 mg/dL / Ketone: x  / Bili: x / Urobili: x   Blood: x / Protein: x / Nitrite: x   Leuk Esterase: x / RBC: x / WBC x   Sq Epi: x / Non Sq Epi: x / Bacteria: x          RADIOLOGY & ADDITIONAL TESTS:    MEDICATIONS  (STANDING):  acetaminophen     Tablet .. 650 milliGRAM(s) Oral every 6 hours  DAPTOmycin IVPB 500 milliGRAM(s) IV Intermittent every 24 hours  DAPTOmycin IVPB      influenza   Vaccine 0.5 milliLiter(s) IntraMuscular once    MEDICATIONS  (PRN):  ketorolac   Injectable 15 milliGRAM(s) IV Push every 6 hours PRN Severe Pain (7 - 10)   OVERNIGHT EVENTS:     SUBJECTIVE:  Patient seen and examined at bedside.    Vital Signs Last 12 Hrs  T(F): 98.2 (09-20-23 @ 06:39), Max: 98.2 (09-20-23 @ 06:39)  HR: 59 (09-20-23 @ 06:39) (56 - 59)  BP: 125/70 (09-20-23 @ 06:39) (125/70 - 140/79)  BP(mean): --  RR: 16 (09-20-23 @ 06:39) (16 - 18)  SpO2: 97% (09-20-23 @ 06:39) (97% - 97%)  I&O's Summary      PHYSICAL EXAM:  Constitutional: NAD, comfortable in bed.  HEENT: NC/AT, PERRLA, EOMI, no conjunctival pallor or scleral icterus, MMM  Neck: Supple, no JVD  Respiratory: CTA B/L. No w/r/r.   Cardiovascular: RRR, normal S1 and S2, no m/r/g.   Gastrointestinal: +BS, soft NTND, no guarding or rebound tenderness, no palpable masses   Extremities: wwp; no cyanosis, clubbing or edema.   Vascular: Pulses equal and strong throughout.   Neurological: AAOx3, no CN deficits, strength and sensation intact throughout.   Skin: No gross skin abnormalities or rashes        LABS:                        12.2   5.45  )-----------( 179      ( 19 Sep 2023 05:30 )             38.1     09-19    138  |  105  |  15  ----------------------------<  114<H>  3.8   |  27  |  1.14    Ca    9.0      19 Sep 2023 05:30  Phos  3.8     09-19  Mg     1.9     09-19    TPro  5.8<L>  /  Alb  3.3  /  TBili  0.2  /  DBili  x   /  AST  11  /  ALT  7<L>  /  AlkPhos  86  09-19      Urinalysis Basic - ( 19 Sep 2023 05:30 )    Color: x / Appearance: x / SG: x / pH: x  Gluc: 114 mg/dL / Ketone: x  / Bili: x / Urobili: x   Blood: x / Protein: x / Nitrite: x   Leuk Esterase: x / RBC: x / WBC x   Sq Epi: x / Non Sq Epi: x / Bacteria: x          RADIOLOGY & ADDITIONAL TESTS:    MEDICATIONS  (STANDING):  acetaminophen     Tablet .. 650 milliGRAM(s) Oral every 6 hours  DAPTOmycin IVPB 500 milliGRAM(s) IV Intermittent every 24 hours  DAPTOmycin IVPB      influenza   Vaccine 0.5 milliLiter(s) IntraMuscular once    MEDICATIONS  (PRN):  ketorolac   Injectable 15 milliGRAM(s) IV Push every 6 hours PRN Severe Pain (7 - 10)   OVERNIGHT EVENTS: Pt reported that his finger infection was draining pus. The dressing was changed by nursing staff over night.     SUBJECTIVE:  Patient seen and examined at bedside. He reports he feels well and notes the pain in his finger has improved since pus drained spontaneously last night. He denies fever, chills, dizziness, nausea, vomiting, diarrhea.     Vital Signs Last 12 Hrs  T(F): 98.2 (09-20-23 @ 06:39), Max: 98.2 (09-20-23 @ 06:39)  HR: 59 (09-20-23 @ 06:39) (56 - 59)  BP: 125/70 (09-20-23 @ 06:39) (125/70 - 140/79)  BP(mean): --  RR: 16 (09-20-23 @ 06:39) (16 - 18)  SpO2: 97% (09-20-23 @ 06:39) (97% - 97%)  I&O's Summary      PHYSICAL EXAM:  Constitutional: NAD, comfortable in bed.  HEENT: No conjunctival pallor or scleral icterus, MMM  Respiratory: Normal respiratory effort, CTA B/L. No w/r/r.   Cardiovascular: RRR, normal S1 and S2, no m/r/g.   Gastrointestinal: +BS, soft NTND, no guarding or rebound tenderness, no palpable masses   Extremities: wwp; no cyanosis, clubbing or edema. There is an ulceration on L 4th finger, with minimal pus and associated with mild swelling of the finger.  There is no active drainage of pus or blood. Patient is able to flex his affected finger at DIP joint, but reports pain when doing so.  Neurological: AAOx3, no aphasia, dysarthria or facial droop. Muscular strength is 5/5 in Upper and Lower extremities bilaterally   Skin: No over skin rashes or abnormalities noted over exposed skin except at above.         LABS:                        12.2   5.45  )-----------( 179      ( 19 Sep 2023 05:30 )             38.1     09-19    138  |  105  |  15  ----------------------------<  114<H>  3.8   |  27  |  1.14    Ca    9.0      19 Sep 2023 05:30  Phos  3.8     09-19  Mg     1.9     09-19    TPro  5.8<L>  /  Alb  3.3  /  TBili  0.2  /  DBili  x   /  AST  11  /  ALT  7<L>  /  AlkPhos  86  09-19      Urinalysis Basic - ( 19 Sep 2023 05:30 )    Color: x / Appearance: x / SG: x / pH: x  Gluc: 114 mg/dL / Ketone: x  / Bili: x / Urobili: x   Blood: x / Protein: x / Nitrite: x   Leuk Esterase: x / RBC: x / WBC x   Sq Epi: x / Non Sq Epi: x / Bacteria: x          RADIOLOGY & ADDITIONAL TESTS:    MEDICATIONS  (STANDING):  acetaminophen     Tablet .. 650 milliGRAM(s) Oral every 6 hours  DAPTOmycin IVPB 500 milliGRAM(s) IV Intermittent every 24 hours  DAPTOmycin IVPB      influenza   Vaccine 0.5 milliLiter(s) IntraMuscular once    MEDICATIONS  (PRN):  ketorolac   Injectable 15 milliGRAM(s) IV Push every 6 hours PRN Severe Pain (7 - 10)

## 2023-09-20 NOTE — PROGRESS NOTE ADULT - PROBLEM SELECTOR PLAN 1
Patient with L 4th finger cellulitis, s/p I&D at OSH with no improvement. 0/4 SIRS criteria, no leukocytosis or fever. No signs of necrotizing fascitis as pain is localized over 4th finger and dorsal aspect of hand.   - Nasal swab + MRSA   - wound cx + MRSA  - bcx - NGTD x24 hours     Plan:  - c/w vancomycin 1250 mg IV redosed with trough  - pain control tylenol and Toradol   - elevation of the affected limb  - f/u ID recs Patient with L 4th finger cellulitis, s/p I&D at OSH with no improvement. 0/4 SIRS criteria, no leukocytosis or fever. No signs of necrotizing fascitis as pain is localized over 4th finger and dorsal aspect of hand.   - Nasal swab + MRSA   - wound cx + MRSA  - bcx - NGTD x24 hours     Plan:  - changed vancomycin to dapto-   - will dc with linezolid in am   - pain control tylenol and Toradol   - elevation of the affected limb  - f/u ID recs Patient with L 4th finger cellulitis, s/p I&D at OSH with no improvement. 0/4 SIRS criteria, no leukocytosis or fever. No signs of necrotizing fascitis as pain is localized over 4th finger and dorsal aspect of hand.   - Nasal swab + MRSA   - wound cx + MRSA  - bcx - x2,  NGTD x3 days    Plan:  - changed vancomycin to dapto- (given rising creatinine and low levels of vanc)  - pain control with tylenol and Toradol   - elevation of the affected limb  - f/u ID recs for antibiotics on discharge: Linezolid 600mg PO BID for 7 days

## 2023-09-20 NOTE — PROGRESS NOTE ADULT - SUBJECTIVE AND OBJECTIVE BOX
INFECTIOUS DISEASES CONSULT FOLLOW-UP NOTE    INTERVAL HPI/OVERNIGHT EVENTS:      ROS:   Constitutional, eyes, ENT, cardiovascular, respiratory, gastrointestinal, genitourinary, integumentary, neurological, psychiatric and heme/lymph are otherwise negative other than noted above       ANTIBIOTICS/RELEVANT:    MEDICATIONS  (STANDING):  acetaminophen     Tablet .. 650 milliGRAM(s) Oral every 6 hours  DAPTOmycin IVPB 500 milliGRAM(s) IV Intermittent every 24 hours  DAPTOmycin IVPB      influenza   Vaccine 0.5 milliLiter(s) IntraMuscular once    MEDICATIONS  (PRN):  ketorolac   Injectable 15 milliGRAM(s) IV Push every 6 hours PRN Severe Pain (7 - 10)        Vital Signs Last 24 Hrs  T(C): 37.1 (20 Sep 2023 09:30), Max: 37.1 (20 Sep 2023 09:30)  T(F): 98.7 (20 Sep 2023 09:30), Max: 98.7 (20 Sep 2023 09:30)  HR: 62 (20 Sep 2023 09:30) (56 - 62)  BP: 145/76 (20 Sep 2023 09:30) (125/70 - 147/96)  BP(mean): --  RR: 18 (20 Sep 2023 09:30) (16 - 18)  SpO2: 96% (20 Sep 2023 09:30) (96% - 97%)    Parameters below as of 20 Sep 2023 09:30  Patient On (Oxygen Delivery Method): room air        PHYSICAL EXAM:  Constitutional: alert, NAD  Eyes: the sclera and conjunctiva were normal.   ENT: the ears and nose were normal in appearance.   Neck: the appearance of the neck was normal and the neck was supple.   Pulmonary: no respiratory distress and lungs were clear to auscultation bilaterally.   Heart: heart rate was normal and rhythm regular, normal S1 and S2  Vascular:. there was no peripheral edema  Abdomen: normal bowel sounds, soft, non-tender  Neurological: no focal deficits.   Psychiatric: the affect was normal        LABS:                        13.5   4.91  )-----------( 229      ( 20 Sep 2023 10:54 )             41.8     09-20    137  |  102  |  x   ----------------------------<  x   4.3   |  x   |  x     Ca    9.0      19 Sep 2023 05:30  Phos  3.4     09-20  Mg     1.9     09-20    TPro  5.8<L>  /  Alb  3.3  /  TBili  0.2  /  DBili  x   /  AST  11  /  ALT  7<L>  /  AlkPhos  86  09-19      Urinalysis Basic - ( 19 Sep 2023 05:30 )    Color: x / Appearance: x / SG: x / pH: x  Gluc: 114 mg/dL / Ketone: x  / Bili: x / Urobili: x   Blood: x / Protein: x / Nitrite: x   Leuk Esterase: x / RBC: x / WBC x   Sq Epi: x / Non Sq Epi: x / Bacteria: x        MICROBIOLOGY:      RADIOLOGY & ADDITIONAL STUDIES:  Reviewed INFECTIOUS DISEASES CONSULT FOLLOW-UP NOTE    INTERVAL HPI/OVERNIGHT EVENTS:  Reports hand much less swollen, still with some pus. Still decreased ROM but overall feeling better.    ROS:   Constitutional, eyes, ENT, cardiovascular, respiratory, gastrointestinal, genitourinary, integumentary, neurological, psychiatric and heme/lymph are otherwise negative other than noted above       ANTIBIOTICS/RELEVANT:    MEDICATIONS  (STANDING):  acetaminophen     Tablet .. 650 milliGRAM(s) Oral every 6 hours  DAPTOmycin IVPB 500 milliGRAM(s) IV Intermittent every 24 hours  DAPTOmycin IVPB      influenza   Vaccine 0.5 milliLiter(s) IntraMuscular once    MEDICATIONS  (PRN):  ketorolac   Injectable 15 milliGRAM(s) IV Push every 6 hours PRN Severe Pain (7 - 10)        Vital Signs Last 24 Hrs  T(C): 37.1 (20 Sep 2023 09:30), Max: 37.1 (20 Sep 2023 09:30)  T(F): 98.7 (20 Sep 2023 09:30), Max: 98.7 (20 Sep 2023 09:30)  HR: 62 (20 Sep 2023 09:30) (56 - 62)  BP: 145/76 (20 Sep 2023 09:30) (125/70 - 147/96)  BP(mean): --  RR: 18 (20 Sep 2023 09:30) (16 - 18)  SpO2: 96% (20 Sep 2023 09:30) (96% - 97%)    Parameters below as of 20 Sep 2023 09:30  Patient On (Oxygen Delivery Method): room air        PHYSICAL EXAM:  General: alert, in no acute distress  HEENT: NC/AT; PERRL, anicteric sclera; MMM  Neck: supple  Cardiovascular: +S1/S2, RRR  Respiratory: CTA B/L; no W/R/R  Gastrointestinal: soft, NT/ND; +BSx4  Extremities: WWP; + edema over L 4th digit with induration between PIP and DIP, ROM limited by pain and edema  Vascular: 2+ radial, DP/PT pulses B/L  Neurological: AAOx3; no focal deficits        LABS:                        13.5   4.91  )-----------( 229      ( 20 Sep 2023 10:54 )             41.8     09-20    137  |  102  |  x   ----------------------------<  x   4.3   |  x   |  x     Ca    9.0      19 Sep 2023 05:30  Phos  3.4     09-20  Mg     1.9     09-20    TPro  5.8<L>  /  Alb  3.3  /  TBili  0.2  /  DBili  x   /  AST  11  /  ALT  7<L>  /  AlkPhos  86  09-19      Urinalysis Basic - ( 19 Sep 2023 05:30 )    Color: x / Appearance: x / SG: x / pH: x  Gluc: 114 mg/dL / Ketone: x  / Bili: x / Urobili: x   Blood: x / Protein: x / Nitrite: x   Leuk Esterase: x / RBC: x / WBC x   Sq Epi: x / Non Sq Epi: x / Bacteria: x        MICROBIOLOGY:      RADIOLOGY & ADDITIONAL STUDIES:  Reviewed

## 2023-09-21 ENCOUNTER — TRANSCRIPTION ENCOUNTER (OUTPATIENT)
Age: 38
End: 2023-09-21

## 2023-09-21 VITALS
TEMPERATURE: 98 F | HEART RATE: 60 BPM | DIASTOLIC BLOOD PRESSURE: 69 MMHG | RESPIRATION RATE: 16 BRPM | SYSTOLIC BLOOD PRESSURE: 116 MMHG | OXYGEN SATURATION: 98 %

## 2023-09-21 PROCEDURE — 96366 THER/PROPH/DIAG IV INF ADDON: CPT

## 2023-09-21 PROCEDURE — 80202 ASSAY OF VANCOMYCIN: CPT

## 2023-09-21 PROCEDURE — 87205 SMEAR GRAM STAIN: CPT

## 2023-09-21 PROCEDURE — 85025 COMPLETE CBC W/AUTO DIFF WBC: CPT

## 2023-09-21 PROCEDURE — 87075 CULTR BACTERIA EXCEPT BLOOD: CPT

## 2023-09-21 PROCEDURE — 87389 HIV-1 AG W/HIV-1&-2 AB AG IA: CPT

## 2023-09-21 PROCEDURE — 87181 SC STD AGAR DILUTION PER AGT: CPT

## 2023-09-21 PROCEDURE — 36415 COLL VENOUS BLD VENIPUNCTURE: CPT

## 2023-09-21 PROCEDURE — 99285 EMERGENCY DEPT VISIT HI MDM: CPT | Mod: 25

## 2023-09-21 PROCEDURE — 96365 THER/PROPH/DIAG IV INF INIT: CPT

## 2023-09-21 PROCEDURE — 87186 SC STD MICRODIL/AGAR DIL: CPT

## 2023-09-21 PROCEDURE — 96375 TX/PRO/DX INJ NEW DRUG ADDON: CPT

## 2023-09-21 PROCEDURE — 80053 COMPREHEN METABOLIC PANEL: CPT

## 2023-09-21 PROCEDURE — 73130 X-RAY EXAM OF HAND: CPT

## 2023-09-21 PROCEDURE — 99239 HOSP IP/OBS DSCHRG MGMT >30: CPT | Mod: GC

## 2023-09-21 PROCEDURE — 83735 ASSAY OF MAGNESIUM: CPT

## 2023-09-21 PROCEDURE — 87640 STAPH A DNA AMP PROBE: CPT

## 2023-09-21 PROCEDURE — 87040 BLOOD CULTURE FOR BACTERIA: CPT

## 2023-09-21 PROCEDURE — 87641 MR-STAPH DNA AMP PROBE: CPT

## 2023-09-21 PROCEDURE — 87070 CULTURE OTHR SPECIMN AEROBIC: CPT

## 2023-09-21 PROCEDURE — 84100 ASSAY OF PHOSPHORUS: CPT

## 2023-09-21 PROCEDURE — 96368 THER/DIAG CONCURRENT INF: CPT

## 2023-09-21 PROCEDURE — 82550 ASSAY OF CK (CPK): CPT

## 2023-09-21 RX ORDER — CHLORHEXIDINE GLUCONATE 213 G/1000ML
1 SOLUTION TOPICAL
Refills: 0 | Status: DISCONTINUED | OUTPATIENT
Start: 2023-09-21 | End: 2023-09-21

## 2023-09-21 RX ORDER — LINEZOLID 600 MG/300ML
1 INJECTION, SOLUTION INTRAVENOUS
Qty: 14 | Refills: 0
Start: 2023-09-21 | End: 2023-09-27

## 2023-09-21 RX ADMIN — Medication 650 MILLIGRAM(S): at 00:09

## 2023-09-21 RX ADMIN — Medication 650 MILLIGRAM(S): at 06:42

## 2023-09-21 RX ADMIN — Medication 650 MILLIGRAM(S): at 06:06

## 2023-09-21 RX ADMIN — Medication 650 MILLIGRAM(S): at 00:58

## 2023-09-22 LAB
CULTURE RESULTS: SIGNIFICANT CHANGE UP
CULTURE RESULTS: SIGNIFICANT CHANGE UP
SPECIMEN SOURCE: SIGNIFICANT CHANGE UP
SPECIMEN SOURCE: SIGNIFICANT CHANGE UP

## 2023-09-27 ENCOUNTER — APPOINTMENT (OUTPATIENT)
Dept: INTERNAL MEDICINE | Facility: CLINIC | Age: 38
End: 2023-09-27

## 2023-09-27 PROBLEM — Z00.00 ENCOUNTER FOR PREVENTIVE HEALTH EXAMINATION: Status: ACTIVE | Noted: 2023-09-27

## 2023-09-29 DIAGNOSIS — E87.1 HYPO-OSMOLALITY AND HYPONATREMIA: ICD-10-CM

## 2023-09-29 DIAGNOSIS — B95.62 METHICILLIN RESISTANT STAPHYLOCOCCUS AUREUS INFECTION AS THE CAUSE OF DISEASES CLASSIFIED ELSEWHERE: ICD-10-CM

## 2023-09-29 DIAGNOSIS — L02.512 CUTANEOUS ABSCESS OF LEFT HAND: ICD-10-CM

## 2023-09-29 DIAGNOSIS — N17.9 ACUTE KIDNEY FAILURE, UNSPECIFIED: ICD-10-CM

## 2023-09-29 DIAGNOSIS — L03.114 CELLULITIS OF LEFT UPPER LIMB: ICD-10-CM

## 2023-09-29 DIAGNOSIS — E83.39 OTHER DISORDERS OF PHOSPHORUS METABOLISM: ICD-10-CM

## 2024-06-19 NOTE — H&P ADULT - PROBLEM SELECTOR PLAN 1
[Dear  ___] : Dear  [unfilled], [Consult Letter:] : I had the pleasure of evaluating your patient, [unfilled]. [Please see my note below.] : Please see my note below. [Consult Closing:] : Thank you very much for allowing me to participate in the care of this patient.  If you have any questions, please do not hesitate to contact me. [Sincerely,] : Sincerely, [DrAllan  ___] : Dr. URBINA Patient with L 4th finger cellulitis, s/p I&D at OSH with no improvement. 0/4 SIRS criteria, no leukocytosis or fever. NO signs of necrotizing fascitis as pain is localized over 4th finger and dorsal aspect of hand.     Plan:  - IV CTX 1g IV qd  - f/u Bcx  - f/u wound cx  - elevation of the affected limb Patient with L 4th finger cellulitis, s/p I&D at OSH with no improvement. 0/4 SIRS criteria, no leukocytosis or fever. No signs of necrotizing fascitis as pain is localized over 4th finger and dorsal aspect of hand.     Plan:  - c/w vancomycin 1250 mg IV redosed with trough  - pain control tylenol and Toradol   - f/u MRSA swab  - f/u Bcx  - elevation of the affected limb

## 2025-01-12 NOTE — DISCHARGE NOTE NURSING/CASE MANAGEMENT/SOCIAL WORK - PATIENT PORTAL LINK FT
You can access the FollowMyHealth Patient Portal offered by [facility] by registering at the following website: [email or web address]. By joining [facility]’s FollowMyHealth portal, you will also be able to view your health information using other applications (apps) compatible with our system. no